# Patient Record
Sex: FEMALE | Race: WHITE | NOT HISPANIC OR LATINO | Employment: OTHER | URBAN - METROPOLITAN AREA
[De-identification: names, ages, dates, MRNs, and addresses within clinical notes are randomized per-mention and may not be internally consistent; named-entity substitution may affect disease eponyms.]

---

## 2023-02-21 PROBLEM — E07.9 THYROID DISEASE: Status: ACTIVE | Noted: 2023-02-21

## 2023-02-21 PROBLEM — E23.0 HYPOPITUITARISM (MULTI): Status: ACTIVE | Noted: 2023-02-21

## 2023-02-21 PROBLEM — E23.0: Status: ACTIVE | Noted: 2023-02-21

## 2023-02-21 PROBLEM — E03.9 HYPOTHYROIDISM: Status: ACTIVE | Noted: 2023-02-21

## 2023-02-21 PROBLEM — R73.9 HYPERGLYCEMIA: Status: ACTIVE | Noted: 2023-02-21

## 2023-02-21 PROBLEM — H53.9 VISION CHANGES: Status: ACTIVE | Noted: 2023-02-21

## 2023-02-21 PROBLEM — E03.9 ACQUIRED HYPOTHYROIDISM: Status: ACTIVE | Noted: 2023-02-21

## 2023-02-21 PROBLEM — E55.9 VITAMIN D INSUFFICIENCY: Status: ACTIVE | Noted: 2023-02-21

## 2023-02-21 PROBLEM — R80.9 PROTEINURIA: Status: ACTIVE | Noted: 2023-02-21

## 2023-02-21 PROBLEM — E87.6 HYPOKALEMIA: Status: ACTIVE | Noted: 2023-02-21

## 2023-02-21 PROBLEM — K52.9 GASTROENTERITIS: Status: ACTIVE | Noted: 2023-02-21

## 2023-02-21 PROBLEM — H91.90 HEARING IMPAIRMENT: Status: ACTIVE | Noted: 2023-02-21

## 2023-02-21 PROBLEM — I10 BENIGN ESSENTIAL HYPERTENSION: Status: ACTIVE | Noted: 2023-02-21

## 2023-02-21 PROBLEM — M81.0 OSTEOPOROSIS: Status: ACTIVE | Noted: 2023-02-21

## 2023-02-21 RX ORDER — HYDROCORTISONE 5 MG/1
TABLET ORAL
COMMUNITY
Start: 2021-03-31 | End: 2023-03-09 | Stop reason: SDUPTHER

## 2023-02-21 RX ORDER — POTASSIUM CHLORIDE 750 MG/1
TABLET, FILM COATED, EXTENDED RELEASE ORAL
COMMUNITY
Start: 2020-08-07 | End: 2023-03-09 | Stop reason: SDUPTHER

## 2023-02-21 RX ORDER — LEVOTHYROXINE SODIUM 50 UG/1
TABLET ORAL
COMMUNITY
Start: 2019-12-23 | End: 2023-07-19 | Stop reason: SDUPTHER

## 2023-02-21 RX ORDER — ALENDRONATE SODIUM 35 MG/1
TABLET ORAL
COMMUNITY
Start: 2021-02-10 | End: 2023-04-25 | Stop reason: SDDI

## 2023-03-09 DIAGNOSIS — E23.0 ACTH DEFICIENCY (MULTI): Primary | ICD-10-CM

## 2023-03-09 DIAGNOSIS — I10 BENIGN ESSENTIAL HYPERTENSION: ICD-10-CM

## 2023-03-09 RX ORDER — LEVOCETIRIZINE DIHYDROCHLORIDE 5 MG/1
1 TABLET, FILM COATED ORAL DAILY
COMMUNITY
Start: 2022-11-30 | End: 2023-04-25 | Stop reason: WASHOUT

## 2023-03-09 RX ORDER — CEFDINIR 300 MG/1
1 CAPSULE ORAL 2 TIMES DAILY
COMMUNITY
Start: 2022-12-14 | End: 2023-04-25 | Stop reason: WASHOUT

## 2023-03-09 RX ORDER — AMLODIPINE BESYLATE 5 MG/1
1 TABLET ORAL DAILY
COMMUNITY
Start: 2022-12-22 | End: 2023-06-19 | Stop reason: SDUPTHER

## 2023-03-09 RX ORDER — AMOXICILLIN 500 MG/1
TABLET, FILM COATED ORAL
COMMUNITY
Start: 2023-01-24 | End: 2023-04-25 | Stop reason: WASHOUT

## 2023-03-09 RX ORDER — BENZONATATE 100 MG/1
1 CAPSULE ORAL
COMMUNITY
Start: 2022-12-14 | End: 2023-04-25 | Stop reason: SDDI

## 2023-03-10 RX ORDER — HYDROCORTISONE 5 MG/1
5 TABLET ORAL EVERY 12 HOURS
Qty: 180 TABLET | Refills: 1 | Status: SHIPPED | OUTPATIENT
Start: 2023-03-10 | End: 2023-10-19 | Stop reason: SDUPTHER

## 2023-03-10 RX ORDER — POTASSIUM CHLORIDE 750 MG/1
10 TABLET, FILM COATED, EXTENDED RELEASE ORAL 2 TIMES DAILY
Qty: 60 TABLET | Refills: 1 | Status: SHIPPED | OUTPATIENT
Start: 2023-03-10 | End: 2023-04-09

## 2023-04-03 ENCOUNTER — TELEPHONE (OUTPATIENT)
Dept: PRIMARY CARE | Facility: CLINIC | Age: 83
End: 2023-04-03
Payer: MEDICARE

## 2023-04-25 ENCOUNTER — PATIENT OUTREACH (OUTPATIENT)
Dept: PRIMARY CARE | Facility: CLINIC | Age: 83
End: 2023-04-25

## 2023-04-25 ENCOUNTER — OFFICE VISIT (OUTPATIENT)
Dept: PRIMARY CARE | Facility: CLINIC | Age: 83
End: 2023-04-25
Payer: MEDICARE

## 2023-04-25 ENCOUNTER — LAB (OUTPATIENT)
Dept: LAB | Facility: LAB | Age: 83
End: 2023-04-25
Payer: MEDICARE

## 2023-04-25 VITALS
HEIGHT: 59 IN | OXYGEN SATURATION: 96 % | BODY MASS INDEX: 24.39 KG/M2 | DIASTOLIC BLOOD PRESSURE: 72 MMHG | SYSTOLIC BLOOD PRESSURE: 131 MMHG | HEART RATE: 53 BPM | WEIGHT: 121 LBS

## 2023-04-25 DIAGNOSIS — Z78.0 ASYMPTOMATIC MENOPAUSAL STATE: ICD-10-CM

## 2023-04-25 DIAGNOSIS — E55.9 VITAMIN D INSUFFICIENCY: ICD-10-CM

## 2023-04-25 DIAGNOSIS — R73.9 HYPERGLYCEMIA: ICD-10-CM

## 2023-04-25 DIAGNOSIS — Z13.6 SCREENING FOR CARDIOVASCULAR CONDITION: ICD-10-CM

## 2023-04-25 DIAGNOSIS — E03.9 HYPOTHYROIDISM, UNSPECIFIED TYPE: ICD-10-CM

## 2023-04-25 DIAGNOSIS — Z12.31 ENCOUNTER FOR SCREENING MAMMOGRAM FOR BREAST CANCER: ICD-10-CM

## 2023-04-25 DIAGNOSIS — I10 BENIGN ESSENTIAL HYPERTENSION: ICD-10-CM

## 2023-04-25 DIAGNOSIS — Z00.00 ROUTINE GENERAL MEDICAL EXAMINATION AT HEALTH CARE FACILITY: Primary | ICD-10-CM

## 2023-04-25 LAB
ANION GAP IN SER/PLAS: 14 MMOL/L (ref 10–20)
APPEARANCE, URINE: NORMAL
BILIRUBIN, URINE: NEGATIVE
BLOOD, URINE: NEGATIVE
CALCIDIOL (25 OH VITAMIN D3) (NG/ML) IN SER/PLAS: 51 NG/ML
CALCIUM (MG/DL) IN SER/PLAS: 9.7 MG/DL (ref 8.6–10.6)
CARBON DIOXIDE, TOTAL (MMOL/L) IN SER/PLAS: 31 MMOL/L (ref 21–32)
CHLORIDE (MMOL/L) IN SER/PLAS: 101 MMOL/L (ref 98–107)
COLOR, URINE: YELLOW
CREATININE (MG/DL) IN SER/PLAS: 1 MG/DL (ref 0.5–1.05)
ESTIMATED AVERAGE GLUCOSE FOR HBA1C: 105 MG/DL
GFR FEMALE: 56 ML/MIN/1.73M2
GLUCOSE (MG/DL) IN SER/PLAS: 80 MG/DL (ref 74–99)
GLUCOSE, URINE: NEGATIVE MG/DL
HEMOGLOBIN A1C/HEMOGLOBIN TOTAL IN BLOOD: 5.3 %
KETONES, URINE: NEGATIVE MG/DL
LEUKOCYTE ESTERASE, URINE: NEGATIVE
NITRITE, URINE: NEGATIVE
PH, URINE: 5 (ref 5–8)
POTASSIUM (MMOL/L) IN SER/PLAS: 4.1 MMOL/L (ref 3.5–5.3)
PROTEIN, URINE: NEGATIVE MG/DL
SODIUM (MMOL/L) IN SER/PLAS: 142 MMOL/L (ref 136–145)
SPECIFIC GRAVITY, URINE: 1.01 (ref 1–1.03)
UREA NITROGEN (MG/DL) IN SER/PLAS: 18 MG/DL (ref 6–23)
UROBILINOGEN, URINE: <2 MG/DL (ref 0–1.9)

## 2023-04-25 PROCEDURE — 93000 ELECTROCARDIOGRAM COMPLETE: CPT | Performed by: INTERNAL MEDICINE

## 2023-04-25 PROCEDURE — 80048 BASIC METABOLIC PNL TOTAL CA: CPT

## 2023-04-25 PROCEDURE — 3078F DIAST BP <80 MM HG: CPT | Performed by: INTERNAL MEDICINE

## 2023-04-25 PROCEDURE — 82306 VITAMIN D 25 HYDROXY: CPT

## 2023-04-25 PROCEDURE — 99214 OFFICE O/P EST MOD 30 MIN: CPT | Performed by: INTERNAL MEDICINE

## 2023-04-25 PROCEDURE — 3075F SYST BP GE 130 - 139MM HG: CPT | Performed by: INTERNAL MEDICINE

## 2023-04-25 PROCEDURE — 1160F RVW MEDS BY RX/DR IN RCRD: CPT | Performed by: INTERNAL MEDICINE

## 2023-04-25 PROCEDURE — G0439 PPPS, SUBSEQ VISIT: HCPCS | Performed by: INTERNAL MEDICINE

## 2023-04-25 PROCEDURE — 36415 COLL VENOUS BLD VENIPUNCTURE: CPT

## 2023-04-25 PROCEDURE — 81003 URINALYSIS AUTO W/O SCOPE: CPT

## 2023-04-25 PROCEDURE — 1159F MED LIST DOCD IN RCRD: CPT | Performed by: INTERNAL MEDICINE

## 2023-04-25 PROCEDURE — 1170F FXNL STATUS ASSESSED: CPT | Performed by: INTERNAL MEDICINE

## 2023-04-25 PROCEDURE — 83036 HEMOGLOBIN GLYCOSYLATED A1C: CPT

## 2023-04-25 RX ORDER — POTASSIUM CHLORIDE 750 MG/1
TABLET, EXTENDED RELEASE ORAL
COMMUNITY
Start: 2023-04-13 | End: 2023-05-18 | Stop reason: SDUPTHER

## 2023-04-25 ASSESSMENT — ACTIVITIES OF DAILY LIVING (ADL)
DRESSING: INDEPENDENT
BATHING: INDEPENDENT
MANAGING_FINANCES: INDEPENDENT
TAKING_MEDICATION: INDEPENDENT
GROCERY_SHOPPING: INDEPENDENT
DOING_HOUSEWORK: INDEPENDENT

## 2023-04-25 ASSESSMENT — PATIENT HEALTH QUESTIONNAIRE - PHQ9
2. FEELING DOWN, DEPRESSED OR HOPELESS: NOT AT ALL
SUM OF ALL RESPONSES TO PHQ9 QUESTIONS 1 AND 2: 0
2. FEELING DOWN, DEPRESSED OR HOPELESS: NOT AT ALL
1. LITTLE INTEREST OR PLEASURE IN DOING THINGS: NOT AT ALL
SUM OF ALL RESPONSES TO PHQ9 QUESTIONS 1 AND 2: 0
1. LITTLE INTEREST OR PLEASURE IN DOING THINGS: NOT AT ALL

## 2023-04-25 ASSESSMENT — LIFESTYLE VARIABLES
AUDIT-C TOTAL SCORE: 2
SKIP TO QUESTIONS 9-10: 0
HOW OFTEN DO YOU HAVE SIX OR MORE DRINKS ON ONE OCCASION: LESS THAN MONTHLY
HOW MANY STANDARD DRINKS CONTAINING ALCOHOL DO YOU HAVE ON A TYPICAL DAY: 1 OR 2
HOW OFTEN DO YOU HAVE A DRINK CONTAINING ALCOHOL: MONTHLY OR LESS

## 2023-04-25 ASSESSMENT — PAIN SCALES - GENERAL: PAINLEVEL: 0-NO PAIN

## 2023-04-25 NOTE — PROGRESS NOTES
"Subjective   Patient ID: Bridgette Moody is a 82 y.o. female who presents for Medicare Annual Wellness Visit Initial.    HPI     Review of Systems    Objective   /72   Pulse 53   Ht 1.499 m (4' 11\")   Wt 54.9 kg (121 lb)   SpO2 96%   BMI 24.44 kg/m²     Physical Exam    Assessment/Plan          "

## 2023-04-25 NOTE — PROGRESS NOTES
"Subjective   Reason for Visit: Bridgette Moody is an 82 y.o. female here for a Medicare Wellness visit.          Reviewed all medications by prescribing practitioner or clinical pharmacist (such as prescriptions, OTCs, herbal therapies and supplements) and documented in the medical record.    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  She is doing well and denies any cough congestion, headache, nausea vomiting and swelling of the legs.  Laboratory studies done on January 12, 2023 revealed potassium of 3.3 GFR of 55 mL/min, hemoglobin of 15.4 hematocrit of 48.1 and other studies were unremarkable.  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  She is also here for Medicare annual wellness exam.  Patient Care Team:  Vimal Mac MD as PCP - General  Vimal Mac MD as PCP - [employer] PCP     Review of Systems   Constitutional: Negative.    HENT: Negative.     Eyes: Negative.    Respiratory: Negative.     Cardiovascular: Negative.    Gastrointestinal: Negative.    Endocrine: Negative.    Genitourinary: Negative.    Musculoskeletal: Negative.    Skin: Negative.    Allergic/Immunologic: Negative.    Neurological: Negative.    Hematological: Negative.    Psychiatric/Behavioral: Negative.         Objective   Vitals:  /72   Pulse 53   Ht 1.499 m (4' 11\")   Wt 54.9 kg (121 lb)   SpO2 96%   BMI 24.44 kg/m²       Physical Exam  Constitutional:       Appearance: Normal appearance. She is normal weight.   HENT:      Right Ear: Tympanic membrane normal.      Left Ear: Tympanic membrane and ear canal " normal.      Nose: Nose normal.   Neck:      Vascular: No carotid bruit.   Cardiovascular:      Rate and Rhythm: Normal rate.   Pulmonary:      Effort: No respiratory distress.      Breath sounds: No stridor. No wheezing.   Abdominal:      Palpations: Abdomen is soft.      Tenderness: There is no guarding or rebound.   Skin:     Coloration: Skin is not jaundiced.   Neurological:      General: No focal deficit present.      Mental Status: She is alert and oriented to person, place, and time.   Psychiatric:         Mood and Affect: Mood normal.         Assessment/Plan   Problem List Items Addressed This Visit    None  Patient will be scheduled for mammogram regarding screening for breast cancer.  She is also advised to obtain routine blood work.  She will continue to follow-up with endocrinology clinic regarding history of hypopituitarism.  She will continue current medications and will return to clinic in 4 months for follow-up visit.

## 2023-04-26 ASSESSMENT — ENCOUNTER SYMPTOMS
CONSTITUTIONAL NEGATIVE: 1
PSYCHIATRIC NEGATIVE: 1
HEMATOLOGIC/LYMPHATIC NEGATIVE: 1
CARDIOVASCULAR NEGATIVE: 1
NEUROLOGICAL NEGATIVE: 1
EYES NEGATIVE: 1
MUSCULOSKELETAL NEGATIVE: 1
ALLERGIC/IMMUNOLOGIC NEGATIVE: 1
ENDOCRINE NEGATIVE: 1
RESPIRATORY NEGATIVE: 1
GASTROINTESTINAL NEGATIVE: 1

## 2023-05-06 ENCOUNTER — TELEPHONE (OUTPATIENT)
Dept: PRIMARY CARE | Facility: CLINIC | Age: 83
End: 2023-05-06
Payer: MEDICARE

## 2023-05-06 NOTE — TELEPHONE ENCOUNTER
Left voice for patient to schedule a follow up either in office or phone visit to go over test results

## 2023-05-06 NOTE — TELEPHONE ENCOUNTER
----- Message from Vimal Mac MD sent at 5/5/2023 10:44 AM EDT -----  Okay to schedule phone visit to discuss recent bone density and mammogram results next week

## 2023-05-18 DIAGNOSIS — I15.9 SECONDARY HYPERTENSION: Primary | ICD-10-CM

## 2023-05-18 RX ORDER — POTASSIUM CHLORIDE 750 MG/1
10 TABLET, EXTENDED RELEASE ORAL DAILY
Qty: 90 TABLET | Refills: 1 | Status: SHIPPED | OUTPATIENT
Start: 2023-05-18 | End: 2023-05-22 | Stop reason: SDUPTHER

## 2023-05-22 DIAGNOSIS — I15.9 SECONDARY HYPERTENSION: ICD-10-CM

## 2023-05-22 RX ORDER — POTASSIUM CHLORIDE 750 MG/1
10 TABLET, EXTENDED RELEASE ORAL DAILY
Qty: 90 TABLET | Refills: 1 | Status: SHIPPED | OUTPATIENT
Start: 2023-05-22 | End: 2023-08-20

## 2023-05-23 ENCOUNTER — TELEPHONE (OUTPATIENT)
Dept: PRIMARY CARE | Facility: CLINIC | Age: 83
End: 2023-05-23
Payer: MEDICARE

## 2023-06-19 DIAGNOSIS — I10 BENIGN ESSENTIAL HYPERTENSION: Primary | ICD-10-CM

## 2023-06-19 RX ORDER — AMLODIPINE BESYLATE 5 MG/1
5 TABLET ORAL DAILY
Qty: 90 TABLET | Refills: 0 | Status: SHIPPED | OUTPATIENT
Start: 2023-06-19 | End: 2023-06-21 | Stop reason: SDUPTHER

## 2023-06-21 DIAGNOSIS — I10 BENIGN ESSENTIAL HYPERTENSION: ICD-10-CM

## 2023-06-21 RX ORDER — AMLODIPINE BESYLATE 5 MG/1
5 TABLET ORAL DAILY
Qty: 90 TABLET | Refills: 0 | Status: SHIPPED | OUTPATIENT
Start: 2023-06-21 | End: 2023-06-22 | Stop reason: SDUPTHER

## 2023-06-22 DIAGNOSIS — I10 BENIGN ESSENTIAL HYPERTENSION: ICD-10-CM

## 2023-06-22 RX ORDER — AMLODIPINE BESYLATE 5 MG/1
5 TABLET ORAL DAILY
Qty: 90 TABLET | Refills: 3 | Status: SHIPPED | OUTPATIENT
Start: 2023-06-22 | End: 2023-10-02 | Stop reason: SDUPTHER

## 2023-07-19 ENCOUNTER — TELEPHONE (OUTPATIENT)
Dept: PRIMARY CARE | Facility: CLINIC | Age: 83
End: 2023-07-19
Payer: MEDICARE

## 2023-07-19 DIAGNOSIS — E03.9 HYPOTHYROIDISM, UNSPECIFIED TYPE: Primary | ICD-10-CM

## 2023-07-19 RX ORDER — LEVOTHYROXINE SODIUM 50 UG/1
50 TABLET ORAL DAILY
Qty: 90 TABLET | Refills: 1 | Status: SHIPPED | OUTPATIENT
Start: 2023-07-19 | End: 2023-08-29 | Stop reason: SDUPTHER

## 2023-08-25 ENCOUNTER — OFFICE VISIT (OUTPATIENT)
Dept: PRIMARY CARE | Facility: CLINIC | Age: 83
End: 2023-08-25
Payer: MEDICARE

## 2023-08-25 ENCOUNTER — LAB (OUTPATIENT)
Dept: LAB | Facility: LAB | Age: 83
End: 2023-08-25
Payer: MEDICARE

## 2023-08-25 VITALS
TEMPERATURE: 98.6 F | BODY MASS INDEX: 24.6 KG/M2 | DIASTOLIC BLOOD PRESSURE: 71 MMHG | HEIGHT: 59 IN | HEART RATE: 58 BPM | OXYGEN SATURATION: 98 % | WEIGHT: 122 LBS | SYSTOLIC BLOOD PRESSURE: 155 MMHG

## 2023-08-25 DIAGNOSIS — E78.5 DYSLIPIDEMIA: ICD-10-CM

## 2023-08-25 DIAGNOSIS — E03.9 HYPOTHYROIDISM, UNSPECIFIED TYPE: ICD-10-CM

## 2023-08-25 DIAGNOSIS — I10 BENIGN ESSENTIAL HYPERTENSION: ICD-10-CM

## 2023-08-25 DIAGNOSIS — E23.0 ACTH DEFICIENCY (MULTI): ICD-10-CM

## 2023-08-25 DIAGNOSIS — M81.0 AGE-RELATED OSTEOPOROSIS WITHOUT CURRENT PATHOLOGICAL FRACTURE: ICD-10-CM

## 2023-08-25 DIAGNOSIS — R73.9 HYPERGLYCEMIA: ICD-10-CM

## 2023-08-25 DIAGNOSIS — E23.0 HYPOPITUITARISM (MULTI): Primary | ICD-10-CM

## 2023-08-25 DIAGNOSIS — E23.0 HYPOPITUITARISM (MULTI): ICD-10-CM

## 2023-08-25 DIAGNOSIS — E03.9 ACQUIRED HYPOTHYROIDISM: ICD-10-CM

## 2023-08-25 LAB
ALANINE AMINOTRANSFERASE (SGPT) (U/L) IN SER/PLAS: 12 U/L (ref 7–45)
ALBUMIN (G/DL) IN SER/PLAS: 4.6 G/DL (ref 3.4–5)
ALKALINE PHOSPHATASE (U/L) IN SER/PLAS: 63 U/L (ref 33–136)
ANION GAP IN SER/PLAS: 13 MMOL/L (ref 10–20)
ASPARTATE AMINOTRANSFERASE (SGOT) (U/L) IN SER/PLAS: 32 U/L (ref 9–39)
BASOPHILS (10*3/UL) IN BLOOD BY AUTOMATED COUNT: 0.05 X10E9/L (ref 0–0.1)
BASOPHILS/100 LEUKOCYTES IN BLOOD BY AUTOMATED COUNT: 0.8 % (ref 0–2)
BILIRUBIN TOTAL (MG/DL) IN SER/PLAS: 1.1 MG/DL (ref 0–1.2)
CALCIDIOL (25 OH VITAMIN D3) (NG/ML) IN SER/PLAS: 49 NG/ML
CALCIUM (MG/DL) IN SER/PLAS: 10.5 MG/DL (ref 8.6–10.6)
CARBON DIOXIDE, TOTAL (MMOL/L) IN SER/PLAS: 30 MMOL/L (ref 21–32)
CHLORIDE (MMOL/L) IN SER/PLAS: 100 MMOL/L (ref 98–107)
CHOLESTEROL (MG/DL) IN SER/PLAS: 206 MG/DL (ref 0–199)
CHOLESTEROL IN HDL (MG/DL) IN SER/PLAS: 68.4 MG/DL
CHOLESTEROL/HDL RATIO: 3
CREATININE (MG/DL) IN SER/PLAS: 1.12 MG/DL (ref 0.5–1.05)
EOSINOPHILS (10*3/UL) IN BLOOD BY AUTOMATED COUNT: 0.15 X10E9/L (ref 0–0.4)
EOSINOPHILS/100 LEUKOCYTES IN BLOOD BY AUTOMATED COUNT: 2.4 % (ref 0–6)
ERYTHROCYTE DISTRIBUTION WIDTH (RATIO) BY AUTOMATED COUNT: 13.6 % (ref 11.5–14.5)
ERYTHROCYTE MEAN CORPUSCULAR HEMOGLOBIN CONCENTRATION (G/DL) BY AUTOMATED: 33.6 G/DL (ref 32–36)
ERYTHROCYTE MEAN CORPUSCULAR VOLUME (FL) BY AUTOMATED COUNT: 88 FL (ref 80–100)
ERYTHROCYTES (10*6/UL) IN BLOOD BY AUTOMATED COUNT: 5.1 X10E12/L (ref 4–5.2)
ESTIMATED AVERAGE GLUCOSE FOR HBA1C: 97 MG/DL
GFR FEMALE: 49 ML/MIN/1.73M2
GLUCOSE (MG/DL) IN SER/PLAS: 78 MG/DL (ref 74–99)
HEMATOCRIT (%) IN BLOOD BY AUTOMATED COUNT: 44.7 % (ref 36–46)
HEMOGLOBIN (G/DL) IN BLOOD: 15 G/DL (ref 12–16)
HEMOGLOBIN A1C/HEMOGLOBIN TOTAL IN BLOOD: 5 %
IMMATURE GRANULOCYTES/100 LEUKOCYTES IN BLOOD BY AUTOMATED COUNT: 0.5 % (ref 0–0.9)
LDL: 116 MG/DL (ref 0–99)
LEUKOCYTES (10*3/UL) IN BLOOD BY AUTOMATED COUNT: 6.2 X10E9/L (ref 4.4–11.3)
LYMPHOCYTES (10*3/UL) IN BLOOD BY AUTOMATED COUNT: 2.65 X10E9/L (ref 0.8–3)
LYMPHOCYTES/100 LEUKOCYTES IN BLOOD BY AUTOMATED COUNT: 42.5 % (ref 13–44)
MONOCYTES (10*3/UL) IN BLOOD BY AUTOMATED COUNT: 0.51 X10E9/L (ref 0.05–0.8)
MONOCYTES/100 LEUKOCYTES IN BLOOD BY AUTOMATED COUNT: 8.2 % (ref 2–10)
NEUTROPHILS (10*3/UL) IN BLOOD BY AUTOMATED COUNT: 2.85 X10E9/L (ref 1.6–5.5)
NEUTROPHILS/100 LEUKOCYTES IN BLOOD BY AUTOMATED COUNT: 45.6 % (ref 40–80)
NRBC (PER 100 WBCS) BY AUTOMATED COUNT: 0 /100 WBC (ref 0–0)
PLATELETS (10*3/UL) IN BLOOD AUTOMATED COUNT: 344 X10E9/L (ref 150–450)
POTASSIUM (MMOL/L) IN SER/PLAS: 4 MMOL/L (ref 3.5–5.3)
PROTEIN TOTAL: 7.4 G/DL (ref 6.4–8.2)
SODIUM (MMOL/L) IN SER/PLAS: 139 MMOL/L (ref 136–145)
THYROTROPIN (MIU/L) IN SER/PLAS BY DETECTION LIMIT <= 0.05 MIU/L: 0.12 MIU/L (ref 0.44–3.98)
TRIGLYCERIDE (MG/DL) IN SER/PLAS: 110 MG/DL (ref 0–149)
UREA NITROGEN (MG/DL) IN SER/PLAS: 14 MG/DL (ref 6–23)
VLDL: 22 MG/DL (ref 0–40)

## 2023-08-25 PROCEDURE — 36415 COLL VENOUS BLD VENIPUNCTURE: CPT

## 2023-08-25 PROCEDURE — 80053 COMPREHEN METABOLIC PANEL: CPT

## 2023-08-25 PROCEDURE — 3077F SYST BP >= 140 MM HG: CPT | Performed by: INTERNAL MEDICINE

## 2023-08-25 PROCEDURE — 81001 URINALYSIS AUTO W/SCOPE: CPT

## 2023-08-25 PROCEDURE — 85025 COMPLETE CBC W/AUTO DIFF WBC: CPT

## 2023-08-25 PROCEDURE — 82306 VITAMIN D 25 HYDROXY: CPT

## 2023-08-25 PROCEDURE — 99214 OFFICE O/P EST MOD 30 MIN: CPT | Performed by: INTERNAL MEDICINE

## 2023-08-25 PROCEDURE — 1160F RVW MEDS BY RX/DR IN RCRD: CPT | Performed by: INTERNAL MEDICINE

## 2023-08-25 PROCEDURE — 84443 ASSAY THYROID STIM HORMONE: CPT

## 2023-08-25 PROCEDURE — 83036 HEMOGLOBIN GLYCOSYLATED A1C: CPT

## 2023-08-25 PROCEDURE — 1126F AMNT PAIN NOTED NONE PRSNT: CPT | Performed by: INTERNAL MEDICINE

## 2023-08-25 PROCEDURE — 3078F DIAST BP <80 MM HG: CPT | Performed by: INTERNAL MEDICINE

## 2023-08-25 PROCEDURE — 1159F MED LIST DOCD IN RCRD: CPT | Performed by: INTERNAL MEDICINE

## 2023-08-25 PROCEDURE — 1036F TOBACCO NON-USER: CPT | Performed by: INTERNAL MEDICINE

## 2023-08-25 PROCEDURE — 80061 LIPID PANEL: CPT

## 2023-08-25 RX ORDER — RISEDRONATE SODIUM 35 MG/1
35 TABLET, FILM COATED ORAL
Qty: 12 TABLET | Refills: 1 | Status: SHIPPED | OUTPATIENT
Start: 2023-08-25 | End: 2023-11-03 | Stop reason: HOSPADM

## 2023-08-25 ASSESSMENT — PATIENT HEALTH QUESTIONNAIRE - PHQ9
1. LITTLE INTEREST OR PLEASURE IN DOING THINGS: NOT AT ALL
SUM OF ALL RESPONSES TO PHQ9 QUESTIONS 1 AND 2: 0
2. FEELING DOWN, DEPRESSED OR HOPELESS: NOT AT ALL

## 2023-08-25 ASSESSMENT — COLUMBIA-SUICIDE SEVERITY RATING SCALE - C-SSRS: 1. IN THE PAST MONTH, HAVE YOU WISHED YOU WERE DEAD OR WISHED YOU COULD GO TO SLEEP AND NOT WAKE UP?: NO

## 2023-08-25 NOTE — PROGRESS NOTES
"Subjective   Patient ID: Bridgette Moody is a 82 y.o. female who presents for Follow-up.    HPI     Review of Systems    Objective   /71   Pulse 58   Temp 37 °C (98.6 °F)   Ht 1.499 m (4' 11\")   Wt 55.3 kg (122 lb)   SpO2 98%   BMI 24.64 kg/m²     Physical Exam    Assessment/Plan          "

## 2023-08-25 NOTE — PROGRESS NOTES
"Subjective   Patient ID: Bridgette Moody is a 82 y.o. female who presents for Follow-up.    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  She is doing well and is requesting medication for osteoporosis.  She denies any chest pain, cough congestion abdominal pain nausea vomiting and swelling of the legs.    Review of Systems   Constitutional: Negative.    HENT: Negative.     Eyes: Negative.    Respiratory: Negative.     Cardiovascular: Negative.    Gastrointestinal: Negative.    Endocrine: Negative.    Genitourinary: Negative.    Musculoskeletal: Negative.    Skin: Negative.    Allergic/Immunologic: Negative.    Neurological: Negative.    Hematological: Negative.    Psychiatric/Behavioral: Negative.         Objective   /71   Pulse 58   Temp 37 °C (98.6 °F)   Ht 1.499 m (4' 11\")   Wt 55.3 kg (122 lb)   SpO2 98%   BMI 24.64 kg/m²     Physical Exam  Constitutional:       Appearance: Normal appearance. She is normal weight.   HENT:      Right Ear: Tympanic membrane normal.      Left Ear: Tympanic membrane and ear canal normal.      Nose: Nose normal.   Neck:      Vascular: No carotid bruit.   Cardiovascular:      Rate and Rhythm: Normal rate.   Pulmonary:      Effort: No respiratory distress.      Breath sounds: No stridor. No wheezing.   Abdominal:      Palpations: Abdomen is soft.      Tenderness: There is no abdominal tenderness. There is no guarding or rebound.   Skin:     Coloration: Skin is not jaundiced.      Findings: No bruising.   Neurological:      General: No focal deficit present.      Mental Status: She is alert and oriented to person, place, and time.   Psychiatric:         Mood and Affect: Mood normal.         Assessment/Plan    patient will be started on alendronate 35 mg weekly for " osteoporosis and steroid dependence.  She will be scheduled for routine blood work.  She will be notified about her test results.  She will continue to follow-up with endocrinology clinic regarding hypopituitarism.  She will return to clinic in 3 months for follow-up visit..

## 2023-08-26 LAB
APPEARANCE, URINE: ABNORMAL
BILIRUBIN, URINE: NEGATIVE
BLOOD, URINE: NEGATIVE
CALCIUM OXALATE CRYSTALS, URINE: ABNORMAL /HPF
COLOR, URINE: YELLOW
GLUCOSE, URINE: NEGATIVE MG/DL
HYALINE CASTS, URINE: ABNORMAL /LPF
KETONES, URINE: NEGATIVE MG/DL
LEUKOCYTE ESTERASE, URINE: NEGATIVE
MUCUS, URINE: ABNORMAL /LPF
NITRITE, URINE: NEGATIVE
PH, URINE: 5 (ref 5–8)
PROTEIN, URINE: ABNORMAL MG/DL
RBC, URINE: 1 /HPF (ref 0–5)
SPECIFIC GRAVITY, URINE: 1.01 (ref 1–1.03)
SQUAMOUS EPITHELIAL CELLS, URINE: <1 /HPF
UROBILINOGEN, URINE: <2 MG/DL (ref 0–1.9)
WBC, URINE: 5 /HPF (ref 0–5)

## 2023-08-27 ASSESSMENT — ENCOUNTER SYMPTOMS
ALLERGIC/IMMUNOLOGIC NEGATIVE: 1
MUSCULOSKELETAL NEGATIVE: 1
NEUROLOGICAL NEGATIVE: 1
CONSTITUTIONAL NEGATIVE: 1
CARDIOVASCULAR NEGATIVE: 1
EYES NEGATIVE: 1
GASTROINTESTINAL NEGATIVE: 1
HEMATOLOGIC/LYMPHATIC NEGATIVE: 1
PSYCHIATRIC NEGATIVE: 1
ENDOCRINE NEGATIVE: 1
RESPIRATORY NEGATIVE: 1

## 2023-08-29 DIAGNOSIS — E03.9 HYPOTHYROIDISM, UNSPECIFIED TYPE: ICD-10-CM

## 2023-08-29 RX ORDER — LEVOTHYROXINE SODIUM 25 UG/1
25 TABLET ORAL DAILY
Qty: 90 TABLET | Refills: 1 | Status: SHIPPED | OUTPATIENT
Start: 2023-08-29 | End: 2023-12-23

## 2023-09-25 ENCOUNTER — PATIENT OUTREACH (OUTPATIENT)
Dept: PRIMARY CARE | Facility: CLINIC | Age: 83
End: 2023-09-25
Payer: MEDICARE

## 2023-09-25 ENCOUNTER — DOCUMENTATION (OUTPATIENT)
Dept: PRIMARY CARE | Facility: CLINIC | Age: 83
End: 2023-09-25
Payer: MEDICARE

## 2023-09-25 DIAGNOSIS — M62.82 NON-TRAUMATIC RHABDOMYOLYSIS: ICD-10-CM

## 2023-09-25 RX ORDER — POTASSIUM CHLORIDE 750 MG/1
10 TABLET, FILM COATED, EXTENDED RELEASE ORAL 2 TIMES DAILY
COMMUNITY
End: 2023-10-10 | Stop reason: SDUPTHER

## 2023-09-25 RX ORDER — DICYCLOMINE HYDROCHLORIDE 20 MG/1
20 TABLET ORAL 4 TIMES DAILY PRN
COMMUNITY
End: 2023-11-02 | Stop reason: ENTERED-IN-ERROR

## 2023-09-25 NOTE — PROGRESS NOTES
Discharge Facility: [facility]  Discharge Diagnosis: Rhabdomyolysis  Admission Date: 9/17/23  Discharge Date: 9/23/23    PCP Appointment Date: 9/26/23  Specialist Appointment Date: n/a  Hospital Encounter and Summary: Linked   See discharge assessment below for further details   Engagement  Call Start Time: 1450 (9/25/2023  6:20 PM)    Medications  Medications reviewed with patient/caregiver?: Yes (9/25/2023  6:20 PM)  Is the patient having any side effects they believe may be caused by any medication additions or changes?: No (9/25/2023  6:20 PM)  Does the patient have all medications ordered at discharge?: Yes (9/25/2023  6:20 PM)  Care Management Interventions: No intervention needed (9/25/2023  6:20 PM)  Prescription Comments: No medication changes (9/25/2023  6:20 PM)  Is the patient taking all medications as directed (includes completed medication regime)?: Yes (9/25/2023  6:20 PM)  Care Management Interventions: Provided patient education (9/25/2023  6:20 PM)  Medication Comments: See medication list (9/25/2023  6:20 PM)    Appointments  Care Management Interventions: Verified appointment date/time/provider (9/25/2023  6:20 PM)  Has the patient kept scheduled appointments due by today?: Not applicable (9/25/2023  6:20 PM)  Care Management Interventions: Advised patient to keep appointment (9/25/2023  6:20 PM)    Self Management  What is the home health agency?: [facility] (9/25/2023  6:20 PM)  Has [facility] visited the patient within 72 hours of discharge?: Call prior to 72 hours (9/25/2023  6:20 PM)  What Durable Medical Equipment (DME) was ordered?: n/a (9/25/2023  6:20 PM)    Patient Teaching  Does the patient have access to their discharge instructions?: Yes (9/25/2023  6:20 PM)  Care Management Interventions: Reviewed instructions with patient (9/25/2023  6:20 PM)  What is the patient's perception of their health status since discharge?: Same (9/25/2023  6:20 PM)  Is the patient/caregiver able to teach  back the hierarchy of who to call/visit for symptoms/problems? PCP, Specialist, Home Health nurse, Urgent Care, ED, 911: Yes (9/25/2023  6:20 PM)    Wrap Up  Wrap Up Additional Comments: Called the patient for initial outreach post discharge from the hospital. Patient states she is home and recovering fairly. Patient denied any new or worsening symptoms at this time. Patient denied the need for DME or assistance obtaining transportation. Patient states she is unsure if [facility] has called yet as her  usually answers the phone and may have spoken to them. I requested she call back if they have not reached out within 72 hours of discharge. Patient confirmed they had their discharge summary and all medications needed in the home. Patient denied any questions or concerns regarding their hospitalization. TCM phone number provided with the patient encouraged to call with any needs or questions that may arise to help prevent another hospitalization. Patient verbalized her understanding and stated she had no further questions or concerns at this time, but would call back if needed. PCP follow up already scheduled for 9/26/23. (9/25/2023  6:20 PM)  Call End Time: 1500 (9/25/2023  6:20 PM)

## 2023-09-26 ENCOUNTER — OFFICE VISIT (OUTPATIENT)
Dept: PRIMARY CARE | Facility: CLINIC | Age: 83
End: 2023-09-26
Payer: MEDICARE

## 2023-09-26 ENCOUNTER — LAB (OUTPATIENT)
Dept: LAB | Facility: LAB | Age: 83
End: 2023-09-26
Payer: MEDICARE

## 2023-09-26 VITALS
WEIGHT: 121 LBS | OXYGEN SATURATION: 95 % | BODY MASS INDEX: 24.39 KG/M2 | DIASTOLIC BLOOD PRESSURE: 68 MMHG | SYSTOLIC BLOOD PRESSURE: 123 MMHG | HEIGHT: 59 IN | HEART RATE: 50 BPM

## 2023-09-26 DIAGNOSIS — M62.82 NON-TRAUMATIC RHABDOMYOLYSIS: ICD-10-CM

## 2023-09-26 DIAGNOSIS — R19.7 DIARRHEA, UNSPECIFIED TYPE: ICD-10-CM

## 2023-09-26 DIAGNOSIS — N17.0 ACUTE KIDNEY INJURY (AKI) WITH ACUTE TUBULAR NECROSIS (ATN) (CMS-HCC): Primary | ICD-10-CM

## 2023-09-26 PROCEDURE — 3078F DIAST BP <80 MM HG: CPT | Performed by: INTERNAL MEDICINE

## 2023-09-26 PROCEDURE — 36415 COLL VENOUS BLD VENIPUNCTURE: CPT

## 2023-09-26 PROCEDURE — 3074F SYST BP LT 130 MM HG: CPT | Performed by: INTERNAL MEDICINE

## 2023-09-26 PROCEDURE — 1126F AMNT PAIN NOTED NONE PRSNT: CPT | Performed by: INTERNAL MEDICINE

## 2023-09-26 PROCEDURE — 82550 ASSAY OF CK (CPK): CPT

## 2023-09-26 PROCEDURE — 83735 ASSAY OF MAGNESIUM: CPT

## 2023-09-26 PROCEDURE — 1159F MED LIST DOCD IN RCRD: CPT | Performed by: INTERNAL MEDICINE

## 2023-09-26 PROCEDURE — 99496 TRANSJ CARE MGMT HIGH F2F 7D: CPT | Performed by: INTERNAL MEDICINE

## 2023-09-26 PROCEDURE — 80048 BASIC METABOLIC PNL TOTAL CA: CPT

## 2023-09-26 PROCEDURE — 1036F TOBACCO NON-USER: CPT | Performed by: INTERNAL MEDICINE

## 2023-09-26 PROCEDURE — 1160F RVW MEDS BY RX/DR IN RCRD: CPT | Performed by: INTERNAL MEDICINE

## 2023-09-26 RX ORDER — CHOLESTYRAMINE 4 G/9G
1 POWDER, FOR SUSPENSION ORAL 2 TIMES DAILY
Qty: 20 PACKET | Refills: 0 | Status: SHIPPED | OUTPATIENT
Start: 2023-09-26 | End: 2023-11-03 | Stop reason: HOSPADM

## 2023-09-26 ASSESSMENT — PATIENT HEALTH QUESTIONNAIRE - PHQ9
2. FEELING DOWN, DEPRESSED OR HOPELESS: NOT AT ALL
1. LITTLE INTEREST OR PLEASURE IN DOING THINGS: NOT AT ALL
SUM OF ALL RESPONSES TO PHQ9 QUESTIONS 1 AND 2: 0

## 2023-09-26 ASSESSMENT — COLUMBIA-SUICIDE SEVERITY RATING SCALE - C-SSRS: 1. IN THE PAST MONTH, HAVE YOU WISHED YOU WERE DEAD OR WISHED YOU COULD GO TO SLEEP AND NOT WAKE UP?: NO

## 2023-09-26 NOTE — PROGRESS NOTES
"Subjective   Patient ID: Bridgette Moody is a 82 y.o. female who presents for Follow-up.    HPI     Review of Systems    Objective   /68   Pulse 50   Ht 1.499 m (4' 11\")   Wt 54.9 kg (121 lb)   SpO2 95%   BMI 24.44 kg/m²     Physical Exam    Assessment/Plan          "

## 2023-09-26 NOTE — PROGRESS NOTES
"Subjective   Patient ID: Bridgette Moody is a 82 y.o. female who presents for Follow-up.    HPI patient presents to clinic after her discharge from [facility] on 9/23/23 .  She was admitted there for acute kidney injury, rhabdomyolysis, elevated troponin secondary to demand ischemia, dehydration, diarrheal illness with hypokalemia and fall at home.  During hospitalization , she had CT of the head which came back negative for any intracranial pathology and had CT of the chest abdomen pelvis and back negative for any traumatic injury, fluid collection or any mass lesion.  Please see discharge summary for detailed information.  She seems to be doing fairly well and is accompanied by her son.  She is still having generalized weakness, decreased appetite and diarrhea up to 4-5 times on a daily basis.  Her  son is concerned about safety of his parents since  he lives approximately an hour away from them .  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     Review of Systems   Constitutional: Negative.    HENT: Negative.     Eyes: Negative.    Respiratory: Negative.     Cardiovascular: Negative.    Gastrointestinal: Negative.    Endocrine: Negative.    Genitourinary: Negative.    Musculoskeletal: Negative.    Skin: Negative.    Allergic/Immunologic: Negative.    Neurological: Negative.    Hematological: Negative.    Psychiatric/Behavioral: Negative.         Objective   /68   Pulse 50   Ht 1.499 m (4' 11\")   Wt 54.9 kg (121 lb)   SpO2 95%   BMI 24.44 kg/m²     Physical Exam  Constitutional:       Appearance: Normal appearance. She is normal weight.   HENT:      Right Ear: Tympanic membrane normal.      Left Ear: Tympanic membrane and ear canal normal.      Nose: Nose normal.   Neck:      Vascular: No carotid bruit.   Cardiovascular:      Rate and Rhythm: " Normal rate.   Pulmonary:      Effort: No respiratory distress.      Breath sounds: No stridor. No wheezing.   Abdominal:      Palpations: Abdomen is soft.      Tenderness: There is no guarding or rebound.   Skin:     Coloration: Skin is not jaundiced.   Neurological:      General: No focal deficit present.      Mental Status: She is alert and oriented to person, place, and time.   Psychiatric:         Mood and Affect: Mood normal.         Assessment/Plan    patient is encouraged to consider moving near her son's house due to safety reason.  She is advised to drink fluids and avoid sugary and sweetened drinks.  She is given trial with Questran for diarrhea and will retest her stool studies.  He is to discontinue alendronate because of upset stomach and persistent diarrhea.  She will continue other medications and will return to clinic in 2 weeks for follow-up visit.

## 2023-09-27 LAB
ANION GAP IN SER/PLAS: 16 MMOL/L (ref 10–20)
CALCIUM (MG/DL) IN SER/PLAS: 10.2 MG/DL (ref 8.6–10.6)
CARBON DIOXIDE, TOTAL (MMOL/L) IN SER/PLAS: 28 MMOL/L (ref 21–32)
CHLORIDE (MMOL/L) IN SER/PLAS: 98 MMOL/L (ref 98–107)
CREATINE KINASE (U/L) IN SER/PLAS: 48 U/L (ref 0–215)
CREATININE (MG/DL) IN SER/PLAS: 1.18 MG/DL (ref 0.5–1.05)
GFR FEMALE: 46 ML/MIN/1.73M2
GLUCOSE (MG/DL) IN SER/PLAS: 95 MG/DL (ref 74–99)
MAGNESIUM (MG/DL) IN SER/PLAS: 2.03 MG/DL (ref 1.6–2.4)
POTASSIUM (MMOL/L) IN SER/PLAS: 4.8 MMOL/L (ref 3.5–5.3)
SODIUM (MMOL/L) IN SER/PLAS: 137 MMOL/L (ref 136–145)
UREA NITROGEN (MG/DL) IN SER/PLAS: 17 MG/DL (ref 6–23)

## 2023-09-29 ASSESSMENT — ENCOUNTER SYMPTOMS
CONSTITUTIONAL NEGATIVE: 1
ENDOCRINE NEGATIVE: 1
EYES NEGATIVE: 1
RESPIRATORY NEGATIVE: 1
MUSCULOSKELETAL NEGATIVE: 1
PSYCHIATRIC NEGATIVE: 1
GASTROINTESTINAL NEGATIVE: 1
HEMATOLOGIC/LYMPHATIC NEGATIVE: 1
ALLERGIC/IMMUNOLOGIC NEGATIVE: 1
NEUROLOGICAL NEGATIVE: 1
CARDIOVASCULAR NEGATIVE: 1

## 2023-10-02 DIAGNOSIS — I10 BENIGN ESSENTIAL HYPERTENSION: ICD-10-CM

## 2023-10-02 RX ORDER — AMLODIPINE BESYLATE 5 MG/1
5 TABLET ORAL DAILY
Qty: 90 TABLET | Refills: 3 | Status: SHIPPED | OUTPATIENT
Start: 2023-10-02 | End: 2024-10-01

## 2023-10-04 ENCOUNTER — PATIENT OUTREACH (OUTPATIENT)
Dept: PRIMARY CARE | Facility: CLINIC | Age: 83
End: 2023-10-04
Payer: MEDICARE

## 2023-10-04 DIAGNOSIS — N17.0 ACUTE KIDNEY INJURY (AKI) WITH ACUTE TUBULAR NECROSIS (ATN) (CMS-HCC): ICD-10-CM

## 2023-10-04 NOTE — PROGRESS NOTES
Unable to reach patient for call back after patient's follow up appointment with PCP.   DIYAM with call back number for patient to call if needed   If no voicemail available call attempts x 2 were made to contact the patient to assist with any questions or concerns patient may have.

## 2023-10-10 ENCOUNTER — OFFICE VISIT (OUTPATIENT)
Dept: PRIMARY CARE | Facility: CLINIC | Age: 83
End: 2023-10-10
Payer: MEDICARE

## 2023-10-10 VITALS
HEIGHT: 59 IN | DIASTOLIC BLOOD PRESSURE: 74 MMHG | BODY MASS INDEX: 24.39 KG/M2 | OXYGEN SATURATION: 98 % | WEIGHT: 121 LBS | SYSTOLIC BLOOD PRESSURE: 127 MMHG | TEMPERATURE: 98.2 F | HEART RATE: 79 BPM

## 2023-10-10 DIAGNOSIS — R09.82 POST-NASAL DRIP: ICD-10-CM

## 2023-10-10 DIAGNOSIS — E23.0 ACTH DEFICIENCY (MULTI): Primary | ICD-10-CM

## 2023-10-10 PROCEDURE — 1036F TOBACCO NON-USER: CPT | Performed by: INTERNAL MEDICINE

## 2023-10-10 PROCEDURE — 99213 OFFICE O/P EST LOW 20 MIN: CPT | Performed by: INTERNAL MEDICINE

## 2023-10-10 PROCEDURE — 1160F RVW MEDS BY RX/DR IN RCRD: CPT | Performed by: INTERNAL MEDICINE

## 2023-10-10 PROCEDURE — 3078F DIAST BP <80 MM HG: CPT | Performed by: INTERNAL MEDICINE

## 2023-10-10 PROCEDURE — 1159F MED LIST DOCD IN RCRD: CPT | Performed by: INTERNAL MEDICINE

## 2023-10-10 PROCEDURE — 1126F AMNT PAIN NOTED NONE PRSNT: CPT | Performed by: INTERNAL MEDICINE

## 2023-10-10 PROCEDURE — 3074F SYST BP LT 130 MM HG: CPT | Performed by: INTERNAL MEDICINE

## 2023-10-10 RX ORDER — POTASSIUM CHLORIDE 750 MG/1
10 TABLET, FILM COATED, EXTENDED RELEASE ORAL 2 TIMES DAILY
Qty: 180 TABLET | Refills: 3 | Status: SHIPPED | OUTPATIENT
Start: 2023-10-10 | End: 2024-10-09

## 2023-10-10 RX ORDER — LEVOCETIRIZINE DIHYDROCHLORIDE 5 MG/1
5 TABLET, FILM COATED ORAL EVERY EVENING
Qty: 30 TABLET | Refills: 0 | Status: SHIPPED | OUTPATIENT
Start: 2023-10-10 | End: 2024-01-10 | Stop reason: WASHOUT

## 2023-10-10 RX ORDER — AZELASTINE 1 MG/ML
1 SPRAY, METERED NASAL 2 TIMES DAILY
Qty: 30 ML | Refills: 1 | Status: SHIPPED | OUTPATIENT
Start: 2023-10-10 | End: 2024-10-09

## 2023-10-10 ASSESSMENT — PATIENT HEALTH QUESTIONNAIRE - PHQ9
SUM OF ALL RESPONSES TO PHQ9 QUESTIONS 1 AND 2: 0
1. LITTLE INTEREST OR PLEASURE IN DOING THINGS: NOT AT ALL
2. FEELING DOWN, DEPRESSED OR HOPELESS: NOT AT ALL

## 2023-10-10 ASSESSMENT — COLUMBIA-SUICIDE SEVERITY RATING SCALE - C-SSRS
6. HAVE YOU EVER DONE ANYTHING, STARTED TO DO ANYTHING, OR PREPARED TO DO ANYTHING TO END YOUR LIFE?: NO
2. HAVE YOU ACTUALLY HAD ANY THOUGHTS OF KILLING YOURSELF?: NO
1. IN THE PAST MONTH, HAVE YOU WISHED YOU WERE DEAD OR WISHED YOU COULD GO TO SLEEP AND NOT WAKE UP?: NO

## 2023-10-10 ASSESSMENT — ENCOUNTER SYMPTOMS
OCCASIONAL FEELINGS OF UNSTEADINESS: 0
LOSS OF SENSATION IN FEET: 0
DEPRESSION: 0

## 2023-10-10 NOTE — PROGRESS NOTES
"Subjective   Patient ID: Bridgette Moody is a 82 y.o. female who presents for Follow-up (From hospital ) and Med Refill (Hydrocortisone at [facility] ).    HPI     Review of Systems    Objective   /74   Pulse 79   Temp 36.8 °C (98.2 °F)   Ht 1.499 m (4' 11\")   Wt 54.9 kg (121 lb)   SpO2 98%   BMI 24.44 kg/m²     Physical Exam    Assessment/Plan          "

## 2023-10-10 NOTE — PROGRESS NOTES
"Subjective   Patient ID: Bridgette Moody is a 82 y.o. female who presents for Follow-up (From hospital ) and Med Refill (Hydrocortisone at [facility] ).    HPI patient presents to clinic complaining of  sinus drainage for the past 2 weeks.  She is also here to review her laboratory studies.  She denies any fever, chills, headache, cough congestion and shortness of breath. Her  diarrhea seems to have resolved.  Laboratory studies done shows serum creatinine of 1.18 GFR of 46 mL/min ,, hemoglobin of 13.2 and other studies are unremarkable.  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        Review of Systems   Constitutional: Negative.    HENT:  Positive for postnasal drip.    Eyes: Negative.    Respiratory: Negative.     Cardiovascular: Negative.    Gastrointestinal: Negative.    Endocrine: Negative.    Genitourinary: Negative.    Musculoskeletal: Negative.    Skin: Negative.    Allergic/Immunologic: Negative.    Neurological: Negative.    Hematological: Negative.    Psychiatric/Behavioral: Negative.         Objective   /74   Pulse 79   Temp 36.8 °C (98.2 °F)   Ht 1.499 m (4' 11\")   Wt 54.9 kg (121 lb)   SpO2 98%   BMI 24.44 kg/m²     Physical Exam  Constitutional:       Appearance: Normal appearance. She is normal weight.   HENT:      Right Ear: Tympanic membrane normal.      Left Ear: Tympanic membrane and ear canal normal.      Nose: Nose normal.   Neck:      Vascular: No carotid bruit.   Cardiovascular:      Rate and Rhythm: Normal rate.   Pulmonary:      Effort: No respiratory distress.      Breath sounds: No stridor. No wheezing.   Abdominal:      Palpations: Abdomen is soft.      Tenderness: There is no guarding or rebound.   Skin:     Coloration: Skin is not jaundiced.      Findings: No bruising.   Neurological:      General: No focal deficit " present.      Mental Status: She is alert and oriented to person, place, and time.   Psychiatric:         Mood and Affect: Mood normal.         Assessment/Plan    patient is encouraged drink fluids and is given trial with Astelin nasal spray and levocetirizine regarding postnasal drip.  She will continue other medications and will return to clinic in 3 months for follow-up visit.

## 2023-10-12 ASSESSMENT — ENCOUNTER SYMPTOMS
PSYCHIATRIC NEGATIVE: 1
RESPIRATORY NEGATIVE: 1
HEMATOLOGIC/LYMPHATIC NEGATIVE: 1
ENDOCRINE NEGATIVE: 1
ALLERGIC/IMMUNOLOGIC NEGATIVE: 1
MUSCULOSKELETAL NEGATIVE: 1
CONSTITUTIONAL NEGATIVE: 1
CARDIOVASCULAR NEGATIVE: 1
GASTROINTESTINAL NEGATIVE: 1
NEUROLOGICAL NEGATIVE: 1
EYES NEGATIVE: 1

## 2023-10-19 DIAGNOSIS — E23.0 ACTH DEFICIENCY (MULTI): ICD-10-CM

## 2023-10-19 RX ORDER — HYDROCORTISONE 5 MG/1
5 TABLET ORAL EVERY 12 HOURS
Qty: 180 TABLET | Refills: 1 | Status: SHIPPED | OUTPATIENT
Start: 2023-10-19 | End: 2024-02-07 | Stop reason: WASHOUT

## 2023-10-20 ENCOUNTER — OFFICE VISIT (OUTPATIENT)
Dept: PRIMARY CARE | Facility: CLINIC | Age: 83
End: 2023-10-20
Payer: MEDICARE

## 2023-10-20 VITALS
OXYGEN SATURATION: 97 % | BODY MASS INDEX: 23.59 KG/M2 | WEIGHT: 117 LBS | HEART RATE: 69 BPM | HEIGHT: 59 IN | DIASTOLIC BLOOD PRESSURE: 71 MMHG | TEMPERATURE: 97.6 F | SYSTOLIC BLOOD PRESSURE: 119 MMHG

## 2023-10-20 DIAGNOSIS — E23.0: Primary | ICD-10-CM

## 2023-10-20 DIAGNOSIS — J06.9 UPPER RESPIRATORY TRACT INFECTION, UNSPECIFIED TYPE: ICD-10-CM

## 2023-10-20 DIAGNOSIS — R41.9 COGNITIVE SAFETY ISSUE: ICD-10-CM

## 2023-10-20 DIAGNOSIS — M81.0 AGE-RELATED OSTEOPOROSIS WITHOUT CURRENT PATHOLOGICAL FRACTURE: ICD-10-CM

## 2023-10-20 DIAGNOSIS — E03.9 HYPOTHYROIDISM, UNSPECIFIED TYPE: ICD-10-CM

## 2023-10-20 PROCEDURE — 3074F SYST BP LT 130 MM HG: CPT | Performed by: INTERNAL MEDICINE

## 2023-10-20 PROCEDURE — 1126F AMNT PAIN NOTED NONE PRSNT: CPT | Performed by: INTERNAL MEDICINE

## 2023-10-20 PROCEDURE — 1160F RVW MEDS BY RX/DR IN RCRD: CPT | Performed by: INTERNAL MEDICINE

## 2023-10-20 PROCEDURE — 1036F TOBACCO NON-USER: CPT | Performed by: INTERNAL MEDICINE

## 2023-10-20 PROCEDURE — 3078F DIAST BP <80 MM HG: CPT | Performed by: INTERNAL MEDICINE

## 2023-10-20 PROCEDURE — 1159F MED LIST DOCD IN RCRD: CPT | Performed by: INTERNAL MEDICINE

## 2023-10-20 PROCEDURE — 99213 OFFICE O/P EST LOW 20 MIN: CPT | Performed by: INTERNAL MEDICINE

## 2023-10-20 RX ORDER — BENZONATATE 100 MG/1
100 CAPSULE ORAL 3 TIMES DAILY PRN
Qty: 21 CAPSULE | Refills: 0 | Status: SHIPPED | OUTPATIENT
Start: 2023-10-20 | End: 2023-11-03 | Stop reason: HOSPADM

## 2023-10-20 RX ORDER — CEFDINIR 300 MG/1
300 CAPSULE ORAL 2 TIMES DAILY
Qty: 14 CAPSULE | Refills: 0 | Status: SHIPPED | OUTPATIENT
Start: 2023-10-20 | End: 2023-11-03 | Stop reason: HOSPADM

## 2023-10-20 ASSESSMENT — ENCOUNTER SYMPTOMS
OCCASIONAL FEELINGS OF UNSTEADINESS: 0
LOSS OF SENSATION IN FEET: 0
DEPRESSION: 0

## 2023-10-20 ASSESSMENT — COLUMBIA-SUICIDE SEVERITY RATING SCALE - C-SSRS
1. IN THE PAST MONTH, HAVE YOU WISHED YOU WERE DEAD OR WISHED YOU COULD GO TO SLEEP AND NOT WAKE UP?: NO
6. HAVE YOU EVER DONE ANYTHING, STARTED TO DO ANYTHING, OR PREPARED TO DO ANYTHING TO END YOUR LIFE?: NO
2. HAVE YOU ACTUALLY HAD ANY THOUGHTS OF KILLING YOURSELF?: NO

## 2023-10-20 NOTE — PROGRESS NOTES
"Subjective   Patient ID: Bridgette Moody is a 82 y.o. female who presents for Follow-up (From hospital /Lost of appetite ) and Med Refill (Hydrocortisone ).    HPI     Review of Systems    Objective   /71   Pulse 69   Temp 36.4 °C (97.6 °F)   Ht 1.499 m (4' 11\")   Wt 53.1 kg (117 lb)   SpO2 97%   BMI 23.63 kg/m²     Physical Exam    Assessment/Plan          "

## 2023-10-20 NOTE — PROGRESS NOTES
"Subjective   Patient ID: Bridgette Moody is a 82 y.o. female who presents for Follow-up (From hospital /Lost of appetite ) and Med Refill (Hydrocortisone ).    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     Review of Systems     Review of Systems   Constitutional: Negative.    HENT:  Positive for congestion.    Eyes: Negative.    Respiratory:  Positive for cough.    Cardiovascular: Negative.    Gastrointestinal: Negative.    Endocrine: Negative.    Genitourinary: Negative.    Musculoskeletal: Negative.    Skin: Negative.    Allergic/Immunologic: Negative.    Neurological: Negative.    Hematological: Negative.    Psychiatric/Behavioral: Negative.         Objective   /71   Pulse 69   Temp 36.4 °C (97.6 °F)   Ht 1.499 m (4' 11\")   Wt 53.1 kg (117 lb)   SpO2 97%   BMI 23.63 kg/m²     Physical Exam  Constitutional:       Appearance: Normal appearance. She is normal weight.   HENT:      Right Ear: Tympanic membrane normal.      Left Ear: Tympanic membrane and ear canal normal.      Nose: Nose normal.   Neck:      Vascular: No carotid bruit.   Cardiovascular:      Rate and Rhythm: Normal rate.   Pulmonary:      Effort: No respiratory distress.      Breath sounds: No stridor. No wheezing.   Abdominal:      Palpations: Abdomen is soft.      Tenderness: There is no guarding or rebound.   Skin:     Coloration: Skin is not jaundiced.   Neurological:      General: No focal deficit present.      Mental Status: She is alert and oriented to person, place, and time. "   Psychiatric:         Mood and Affect: Mood normal.         Assessment/Plan   Patient will be referred to geriatrician for geriateric assessment.She will be started on omnicef and tessalon capsules s for URI. She is strongly advised to consider moving closer to her son because of safety issues and risk of falls.She will continue current other meds and will return to her scheduled appointment .

## 2023-10-22 ASSESSMENT — ENCOUNTER SYMPTOMS
CARDIOVASCULAR NEGATIVE: 1
EYES NEGATIVE: 1
PSYCHIATRIC NEGATIVE: 1
HEMATOLOGIC/LYMPHATIC NEGATIVE: 1
NEUROLOGICAL NEGATIVE: 1
GASTROINTESTINAL NEGATIVE: 1
COUGH: 1
MUSCULOSKELETAL NEGATIVE: 1
ALLERGIC/IMMUNOLOGIC NEGATIVE: 1
CONSTITUTIONAL NEGATIVE: 1
ENDOCRINE NEGATIVE: 1

## 2023-10-27 ENCOUNTER — OFFICE VISIT (OUTPATIENT)
Dept: PRIMARY CARE | Facility: CLINIC | Age: 83
End: 2023-10-27
Payer: MEDICARE

## 2023-10-27 VITALS
OXYGEN SATURATION: 94 % | HEART RATE: 77 BPM | SYSTOLIC BLOOD PRESSURE: 127 MMHG | BODY MASS INDEX: 23.59 KG/M2 | HEIGHT: 59 IN | DIASTOLIC BLOOD PRESSURE: 74 MMHG | WEIGHT: 117 LBS

## 2023-10-27 DIAGNOSIS — Z48.02: Primary | ICD-10-CM

## 2023-10-27 PROCEDURE — 3074F SYST BP LT 130 MM HG: CPT | Performed by: INTERNAL MEDICINE

## 2023-10-27 PROCEDURE — 1126F AMNT PAIN NOTED NONE PRSNT: CPT | Performed by: INTERNAL MEDICINE

## 2023-10-27 PROCEDURE — 1159F MED LIST DOCD IN RCRD: CPT | Performed by: INTERNAL MEDICINE

## 2023-10-27 PROCEDURE — 1036F TOBACCO NON-USER: CPT | Performed by: INTERNAL MEDICINE

## 2023-10-27 PROCEDURE — 3078F DIAST BP <80 MM HG: CPT | Performed by: INTERNAL MEDICINE

## 2023-10-27 PROCEDURE — 1160F RVW MEDS BY RX/DR IN RCRD: CPT | Performed by: INTERNAL MEDICINE

## 2023-10-27 PROCEDURE — 99212 OFFICE O/P EST SF 10 MIN: CPT | Performed by: INTERNAL MEDICINE

## 2023-10-27 ASSESSMENT — ENCOUNTER SYMPTOMS
ENDOCRINE NEGATIVE: 1
CARDIOVASCULAR NEGATIVE: 1
CONSTITUTIONAL NEGATIVE: 1
EYES NEGATIVE: 1
MUSCULOSKELETAL NEGATIVE: 1
ALLERGIC/IMMUNOLOGIC NEGATIVE: 1
PSYCHIATRIC NEGATIVE: 1
HEMATOLOGIC/LYMPHATIC NEGATIVE: 1
NEUROLOGICAL NEGATIVE: 1
GASTROINTESTINAL NEGATIVE: 1
RESPIRATORY NEGATIVE: 1

## 2023-10-27 NOTE — PROGRESS NOTES
"Subjective   Patient ID: Bridgette Moody is a 82 y.o. female who presents for Suture / Staple Removal.    HPI     Review of Systems    Objective   /74   Pulse 77   Ht 1.499 m (4' 11\")   Wt 53.1 kg (117 lb)   SpO2 94%   BMI 23.63 kg/m²     Physical Exam    Assessment/Plan          "

## 2023-10-27 NOTE — PROGRESS NOTES
"Subjective   Patient ID: Bridgette Moody is a 82 y.o. female who presents for Suture / Staple Removal.    HPI patient presents to clinic for suture removal.  She is otherwise doing well and offers no other complaints.  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        Review of Systems   Constitutional: Negative.    HENT: Negative.     Eyes: Negative.    Respiratory: Negative.     Cardiovascular: Negative.    Gastrointestinal: Negative.    Endocrine: Negative.    Genitourinary: Negative.    Musculoskeletal: Negative.    Skin: Negative.    Allergic/Immunologic: Negative.    Neurological: Negative.    Hematological: Negative.    Psychiatric/Behavioral: Negative.         Objective   /74   Pulse 77   Ht 1.499 m (4' 11\")   Wt 53.1 kg (117 lb)   SpO2 94%   BMI 23.63 kg/m²     Physical Exam  Constitutional:       Appearance: Normal appearance. She is normal weight.   HENT:      Head:      Comments: Suture site from scalp area looks clean and dry without any erythema or drainage     Right Ear: Tympanic membrane normal.      Left Ear: Tympanic membrane and ear canal normal.      Nose: Nose normal.   Neck:      Vascular: No carotid bruit.   Cardiovascular:      Rate and Rhythm: Normal rate.   Pulmonary:      Effort: No respiratory distress.      Breath sounds: No stridor. No wheezing.   Abdominal:      Palpations: Abdomen is soft.      Tenderness: There is no abdominal tenderness. There is no guarding or rebound.   Skin:     Coloration: Skin is not jaundiced.   Neurological:      General: No focal deficit present.      Mental Status: She is alert and oriented to person, place, and time.   Psychiatric:         Mood and Affect: Mood normal.         Assessment/Plan    patient suture removal from the scalp area without any major bleeding or bruising of the " skin noticed.  She will continue home medications and will return to her scheduled appointment.

## 2023-11-02 ENCOUNTER — APPOINTMENT (OUTPATIENT)
Dept: RADIOLOGY | Facility: HOSPITAL | Age: 83
End: 2023-11-02
Payer: MEDICARE

## 2023-11-02 ENCOUNTER — APPOINTMENT (OUTPATIENT)
Dept: PRIMARY CARE | Facility: CLINIC | Age: 83
End: 2023-11-02
Payer: MEDICARE

## 2023-11-02 ENCOUNTER — HOSPITAL ENCOUNTER (OUTPATIENT)
Facility: HOSPITAL | Age: 83
Setting detail: OBSERVATION
Discharge: HOME | End: 2023-11-03
Attending: EMERGENCY MEDICINE | Admitting: INTERNAL MEDICINE
Payer: MEDICARE

## 2023-11-02 DIAGNOSIS — R53.1 WEAKNESS: ICD-10-CM

## 2023-11-02 DIAGNOSIS — E87.1 HYPONATREMIA: ICD-10-CM

## 2023-11-02 DIAGNOSIS — R79.89 LACTATE BLOOD INCREASE: Primary | ICD-10-CM

## 2023-11-02 DIAGNOSIS — J69.0: ICD-10-CM

## 2023-11-02 DIAGNOSIS — E87.6 HYPOKALEMIA: ICD-10-CM

## 2023-11-02 DIAGNOSIS — R09.82 POST-NASAL DRIP: ICD-10-CM

## 2023-11-02 DIAGNOSIS — E83.42 HYPOMAGNESEMIA: ICD-10-CM

## 2023-11-02 LAB
ALBUMIN SERPL BCP-MCNC: 4 G/DL (ref 3.4–5)
ALP SERPL-CCNC: 58 U/L (ref 33–136)
ALT SERPL W P-5'-P-CCNC: 14 U/L (ref 7–45)
ANION GAP SERPL CALC-SCNC: 14 MMOL/L (ref 10–20)
APPEARANCE UR: CLEAR
AST SERPL W P-5'-P-CCNC: 44 U/L (ref 9–39)
BASOPHILS # BLD AUTO: 0.04 X10*3/UL (ref 0–0.1)
BASOPHILS NFR BLD AUTO: 0.8 %
BILIRUB SERPL-MCNC: 1 MG/DL (ref 0–1.2)
BILIRUB UR STRIP.AUTO-MCNC: NEGATIVE MG/DL
BUN SERPL-MCNC: 8 MG/DL (ref 6–23)
CALCIUM SERPL-MCNC: 9.6 MG/DL (ref 8.6–10.3)
CARDIAC TROPONIN I PNL SERPL HS: 5 NG/L (ref 0–13)
CHLORIDE SERPL-SCNC: 91 MMOL/L (ref 98–107)
CK SERPL-CCNC: 40 U/L (ref 0–215)
CO2 SERPL-SCNC: 24 MMOL/L (ref 21–32)
COLOR UR: ABNORMAL
CREAT SERPL-MCNC: 0.89 MG/DL (ref 0.5–1.05)
EOSINOPHIL # BLD AUTO: 0.29 X10*3/UL (ref 0–0.4)
EOSINOPHIL NFR BLD AUTO: 5.5 %
ERYTHROCYTE [DISTWIDTH] IN BLOOD BY AUTOMATED COUNT: 13.2 % (ref 11.5–14.5)
GFR SERPL CREATININE-BSD FRML MDRD: 65 ML/MIN/1.73M*2
GLUCOSE SERPL-MCNC: 105 MG/DL (ref 74–99)
GLUCOSE UR STRIP.AUTO-MCNC: NEGATIVE MG/DL
HCT VFR BLD AUTO: 42.1 % (ref 36–46)
HGB BLD-MCNC: 14.5 G/DL (ref 12–16)
IMM GRANULOCYTES # BLD AUTO: 0.01 X10*3/UL (ref 0–0.5)
IMM GRANULOCYTES NFR BLD AUTO: 0.2 % (ref 0–0.9)
KETONES UR STRIP.AUTO-MCNC: ABNORMAL MG/DL
LACTATE SERPL-SCNC: 1.3 MMOL/L (ref 0.4–2)
LACTATE SERPL-SCNC: 1.4 MMOL/L (ref 0.4–2)
LACTATE SERPL-SCNC: 2.2 MMOL/L (ref 0.4–2)
LEUKOCYTE ESTERASE UR QL STRIP.AUTO: NEGATIVE
LIPASE SERPL-CCNC: 30 U/L (ref 9–82)
LYMPHOCYTES # BLD AUTO: 1.91 X10*3/UL (ref 0.8–3)
LYMPHOCYTES NFR BLD AUTO: 36.5 %
MAGNESIUM SERPL-MCNC: 1.41 MG/DL (ref 1.6–2.4)
MCH RBC QN AUTO: 27.9 PG (ref 26–34)
MCHC RBC AUTO-ENTMCNC: 34.4 G/DL (ref 32–36)
MCV RBC AUTO: 81 FL (ref 80–100)
MONOCYTES # BLD AUTO: 0.44 X10*3/UL (ref 0.05–0.8)
MONOCYTES NFR BLD AUTO: 8.4 %
NEUTROPHILS # BLD AUTO: 2.55 X10*3/UL (ref 1.6–5.5)
NEUTROPHILS NFR BLD AUTO: 48.6 %
NITRITE UR QL STRIP.AUTO: NEGATIVE
NRBC BLD-RTO: 0 /100 WBCS (ref 0–0)
PH UR STRIP.AUTO: 6 [PH]
PLATELET # BLD AUTO: 375 X10*3/UL (ref 150–450)
POTASSIUM SERPL-SCNC: 3 MMOL/L (ref 3.5–5.3)
PROT SERPL-MCNC: 7.5 G/DL (ref 6.4–8.2)
PROT UR STRIP.AUTO-MCNC: NEGATIVE MG/DL
RBC # BLD AUTO: 5.19 X10*6/UL (ref 4–5.2)
RBC # UR STRIP.AUTO: NEGATIVE /UL
SARS-COV-2 RNA RESP QL NAA+PROBE: NOT DETECTED
SODIUM SERPL-SCNC: 126 MMOL/L (ref 136–145)
SP GR UR STRIP.AUTO: 1
T4 FREE SERPL-MCNC: 0.9 NG/DL (ref 0.61–1.12)
TSH SERPL-ACNC: 0.11 MIU/L (ref 0.44–3.98)
UROBILINOGEN UR STRIP.AUTO-MCNC: <2 MG/DL
WBC # BLD AUTO: 5.2 X10*3/UL (ref 4.4–11.3)

## 2023-11-02 PROCEDURE — 71045 X-RAY EXAM CHEST 1 VIEW: CPT | Mod: FOREIGN READ | Performed by: RADIOLOGY

## 2023-11-02 PROCEDURE — 96367 TX/PROPH/DG ADDL SEQ IV INF: CPT | Performed by: EMERGENCY MEDICINE

## 2023-11-02 PROCEDURE — 80053 COMPREHEN METABOLIC PANEL: CPT | Performed by: NURSE PRACTITIONER

## 2023-11-02 PROCEDURE — 99223 1ST HOSP IP/OBS HIGH 75: CPT

## 2023-11-02 PROCEDURE — 87075 CULTR BACTERIA EXCEPT BLOOD: CPT | Mod: GEALAB | Performed by: NURSE PRACTITIONER

## 2023-11-02 PROCEDURE — 81003 URINALYSIS AUTO W/O SCOPE: CPT

## 2023-11-02 PROCEDURE — 96372 THER/PROPH/DIAG INJ SC/IM: CPT

## 2023-11-02 PROCEDURE — 71045 X-RAY EXAM CHEST 1 VIEW: CPT | Mod: FY

## 2023-11-02 PROCEDURE — 36415 COLL VENOUS BLD VENIPUNCTURE: CPT | Performed by: NURSE PRACTITIONER

## 2023-11-02 PROCEDURE — 2500000004 HC RX 250 GENERAL PHARMACY W/ HCPCS (ALT 636 FOR OP/ED): Performed by: NURSE PRACTITIONER

## 2023-11-02 PROCEDURE — 96375 TX/PRO/DX INJ NEW DRUG ADDON: CPT | Performed by: EMERGENCY MEDICINE

## 2023-11-02 PROCEDURE — 83690 ASSAY OF LIPASE: CPT | Performed by: NURSE PRACTITIONER

## 2023-11-02 PROCEDURE — 84443 ASSAY THYROID STIM HORMONE: CPT

## 2023-11-02 PROCEDURE — 2500000001 HC RX 250 WO HCPCS SELF ADMINISTERED DRUGS (ALT 637 FOR MEDICARE OP)

## 2023-11-02 PROCEDURE — 87635 SARS-COV-2 COVID-19 AMP PRB: CPT | Performed by: NURSE PRACTITIONER

## 2023-11-02 PROCEDURE — 83605 ASSAY OF LACTIC ACID: CPT | Mod: 59 | Performed by: NURSE PRACTITIONER

## 2023-11-02 PROCEDURE — 83605 ASSAY OF LACTIC ACID: CPT

## 2023-11-02 PROCEDURE — 85025 COMPLETE CBC W/AUTO DIFF WBC: CPT | Performed by: NURSE PRACTITIONER

## 2023-11-02 PROCEDURE — 84145 PROCALCITONIN (PCT): CPT | Mod: GEALAB

## 2023-11-02 PROCEDURE — 99285 EMERGENCY DEPT VISIT HI MDM: CPT | Mod: 25 | Performed by: EMERGENCY MEDICINE

## 2023-11-02 PROCEDURE — 74176 CT ABD & PELVIS W/O CONTRAST: CPT

## 2023-11-02 PROCEDURE — 87040 BLOOD CULTURE FOR BACTERIA: CPT | Mod: 59,GEALAB | Performed by: NURSE PRACTITIONER

## 2023-11-02 PROCEDURE — 84439 ASSAY OF FREE THYROXINE: CPT

## 2023-11-02 PROCEDURE — 2500000004 HC RX 250 GENERAL PHARMACY W/ HCPCS (ALT 636 FOR OP/ED): Performed by: INTERNAL MEDICINE

## 2023-11-02 PROCEDURE — 96365 THER/PROPH/DIAG IV INF INIT: CPT | Performed by: EMERGENCY MEDICINE

## 2023-11-02 PROCEDURE — 83735 ASSAY OF MAGNESIUM: CPT | Performed by: NURSE PRACTITIONER

## 2023-11-02 PROCEDURE — 74176 CT ABD & PELVIS W/O CONTRAST: CPT | Mod: FOREIGN READ | Performed by: RADIOLOGY

## 2023-11-02 PROCEDURE — 82550 ASSAY OF CK (CPK): CPT | Performed by: NURSE PRACTITIONER

## 2023-11-02 PROCEDURE — 2500000004 HC RX 250 GENERAL PHARMACY W/ HCPCS (ALT 636 FOR OP/ED)

## 2023-11-02 PROCEDURE — G0378 HOSPITAL OBSERVATION PER HR: HCPCS

## 2023-11-02 PROCEDURE — 84484 ASSAY OF TROPONIN QUANT: CPT | Performed by: NURSE PRACTITIONER

## 2023-11-02 PROCEDURE — 2500000001 HC RX 250 WO HCPCS SELF ADMINISTERED DRUGS (ALT 637 FOR MEDICARE OP): Performed by: NURSE PRACTITIONER

## 2023-11-02 RX ORDER — MULTIVIT-MIN/IRON FUM/FOLIC AC 7.5 MG-4
1 TABLET ORAL DAILY
Status: DISCONTINUED | OUTPATIENT
Start: 2023-11-02 | End: 2023-11-03 | Stop reason: HOSPADM

## 2023-11-02 RX ORDER — ONDANSETRON HYDROCHLORIDE 2 MG/ML
4 INJECTION, SOLUTION INTRAVENOUS EVERY 6 HOURS PRN
Status: DISCONTINUED | OUTPATIENT
Start: 2023-11-02 | End: 2023-11-03 | Stop reason: HOSPADM

## 2023-11-02 RX ORDER — POTASSIUM CHLORIDE 1.5 G/1.58G
20 POWDER, FOR SOLUTION ORAL 2 TIMES DAILY
Status: DISCONTINUED | OUTPATIENT
Start: 2023-11-02 | End: 2023-11-03 | Stop reason: HOSPADM

## 2023-11-02 RX ORDER — ALBUTEROL SULFATE 0.83 MG/ML
2.5 SOLUTION RESPIRATORY (INHALATION) EVERY 6 HOURS PRN
Status: DISCONTINUED | OUTPATIENT
Start: 2023-11-02 | End: 2023-11-02

## 2023-11-02 RX ORDER — ONDANSETRON HYDROCHLORIDE 2 MG/ML
4 INJECTION, SOLUTION INTRAVENOUS ONCE
Status: DISCONTINUED | OUTPATIENT
Start: 2023-11-02 | End: 2023-11-03 | Stop reason: HOSPADM

## 2023-11-02 RX ORDER — ONDANSETRON HYDROCHLORIDE 2 MG/ML
4 INJECTION, SOLUTION INTRAVENOUS ONCE
Status: COMPLETED | OUTPATIENT
Start: 2023-11-02 | End: 2023-11-02

## 2023-11-02 RX ORDER — MIRTAZAPINE 15 MG/1
7.5 TABLET, FILM COATED ORAL NIGHTLY
Status: DISCONTINUED | OUTPATIENT
Start: 2023-11-02 | End: 2023-11-03 | Stop reason: HOSPADM

## 2023-11-02 RX ORDER — AMLODIPINE BESYLATE 5 MG/1
5 TABLET ORAL DAILY
Status: DISCONTINUED | OUTPATIENT
Start: 2023-11-02 | End: 2023-11-03 | Stop reason: HOSPADM

## 2023-11-02 RX ORDER — AMOXICILLIN AND CLAVULANATE POTASSIUM 875; 125 MG/1; MG/1
875 TABLET, FILM COATED ORAL EVERY 12 HOURS SCHEDULED
Status: DISCONTINUED | OUTPATIENT
Start: 2023-11-03 | End: 2023-11-03

## 2023-11-02 RX ORDER — LEVOTHYROXINE SODIUM 25 UG/1
25 TABLET ORAL DAILY
Status: DISCONTINUED | OUTPATIENT
Start: 2023-11-03 | End: 2023-11-03 | Stop reason: HOSPADM

## 2023-11-02 RX ORDER — ACETAMINOPHEN 160 MG/5ML
650 SOLUTION ORAL EVERY 4 HOURS PRN
Status: DISCONTINUED | OUTPATIENT
Start: 2023-11-02 | End: 2023-11-03 | Stop reason: HOSPADM

## 2023-11-02 RX ORDER — HEPARIN SODIUM 5000 [USP'U]/ML
5000 INJECTION, SOLUTION INTRAVENOUS; SUBCUTANEOUS EVERY 8 HOURS SCHEDULED
Status: DISCONTINUED | OUTPATIENT
Start: 2023-11-02 | End: 2023-11-03 | Stop reason: HOSPADM

## 2023-11-02 RX ORDER — HYDROCORTISONE 10 MG/1
5 TABLET ORAL EVERY 12 HOURS
Status: DISCONTINUED | OUTPATIENT
Start: 2023-11-02 | End: 2023-11-03 | Stop reason: HOSPADM

## 2023-11-02 RX ORDER — ALBUTEROL SULFATE 0.83 MG/ML
2.5 SOLUTION RESPIRATORY (INHALATION) EVERY 2 HOUR PRN
Status: DISCONTINUED | OUTPATIENT
Start: 2023-11-02 | End: 2023-11-03 | Stop reason: HOSPADM

## 2023-11-02 RX ORDER — BISMUTH SUBSALICYLATE 262 MG
1 TABLET,CHEWABLE ORAL DAILY
COMMUNITY

## 2023-11-02 RX ORDER — POTASSIUM CHLORIDE 20 MEQ/1
20 TABLET, EXTENDED RELEASE ORAL ONCE
Status: COMPLETED | OUTPATIENT
Start: 2023-11-02 | End: 2023-11-02

## 2023-11-02 RX ORDER — MAGNESIUM SULFATE HEPTAHYDRATE 40 MG/ML
2 INJECTION, SOLUTION INTRAVENOUS ONCE
Status: COMPLETED | OUTPATIENT
Start: 2023-11-02 | End: 2023-11-02

## 2023-11-02 RX ORDER — CEFTRIAXONE 1 G/50ML
1 INJECTION, SOLUTION INTRAVENOUS ONCE
Status: COMPLETED | OUTPATIENT
Start: 2023-11-02 | End: 2023-11-02

## 2023-11-02 RX ORDER — ACETAMINOPHEN 325 MG/1
650 TABLET ORAL EVERY 4 HOURS PRN
Status: DISCONTINUED | OUTPATIENT
Start: 2023-11-02 | End: 2023-11-03 | Stop reason: HOSPADM

## 2023-11-02 RX ORDER — SODIUM CHLORIDE, SODIUM LACTATE, POTASSIUM CHLORIDE, CALCIUM CHLORIDE 600; 310; 30; 20 MG/100ML; MG/100ML; MG/100ML; MG/100ML
100 INJECTION, SOLUTION INTRAVENOUS CONTINUOUS
Status: DISCONTINUED | OUTPATIENT
Start: 2023-11-02 | End: 2023-11-03 | Stop reason: HOSPADM

## 2023-11-02 RX ADMIN — MIRTAZAPINE 7.5 MG: 15 TABLET, FILM COATED ORAL at 20:15

## 2023-11-02 RX ADMIN — MAGNESIUM SULFATE HEPTAHYDRATE 2 G: 2 INJECTION, SOLUTION INTRAVENOUS at 13:07

## 2023-11-02 RX ADMIN — POTASSIUM CHLORIDE 20 MEQ: 1.5 POWDER, FOR SOLUTION ORAL at 13:06

## 2023-11-02 RX ADMIN — AZITHROMYCIN MONOHYDRATE 500 MG: 500 INJECTION, POWDER, LYOPHILIZED, FOR SOLUTION INTRAVENOUS at 12:35

## 2023-11-02 RX ADMIN — HYDROCORTISONE 5 MG: 10 TABLET ORAL at 17:40

## 2023-11-02 RX ADMIN — CEFTRIAXONE SODIUM 1 G: 1 INJECTION, SOLUTION INTRAVENOUS at 12:35

## 2023-11-02 RX ADMIN — SODIUM CHLORIDE 500 ML: 9 INJECTION, SOLUTION INTRAVENOUS at 11:46

## 2023-11-02 RX ADMIN — ONDANSETRON 4 MG: 2 INJECTION INTRAMUSCULAR; INTRAVENOUS at 11:46

## 2023-11-02 RX ADMIN — POTASSIUM CHLORIDE 20 MEQ: 1.5 POWDER, FOR SOLUTION ORAL at 20:15

## 2023-11-02 RX ADMIN — HEPARIN SODIUM 5000 UNITS: 5000 INJECTION INTRAVENOUS; SUBCUTANEOUS at 17:29

## 2023-11-02 RX ADMIN — AMLODIPINE BESYLATE 5 MG: 5 TABLET ORAL at 17:29

## 2023-11-02 RX ADMIN — ONDANSETRON 4 MG: 2 INJECTION INTRAMUSCULAR; INTRAVENOUS at 17:40

## 2023-11-02 RX ADMIN — POTASSIUM CHLORIDE 20 MEQ: 1500 TABLET, EXTENDED RELEASE ORAL at 20:14

## 2023-11-02 RX ADMIN — SODIUM CHLORIDE, POTASSIUM CHLORIDE, SODIUM LACTATE AND CALCIUM CHLORIDE 100 ML/HR: 600; 310; 30; 20 INJECTION, SOLUTION INTRAVENOUS at 17:29

## 2023-11-02 SDOH — SOCIAL STABILITY: SOCIAL INSECURITY: ABUSE: ADULT

## 2023-11-02 SDOH — SOCIAL STABILITY: SOCIAL INSECURITY: HAS ANYONE EVER THREATENED TO HURT YOUR FAMILY OR YOUR PETS?: NO

## 2023-11-02 SDOH — SOCIAL STABILITY: SOCIAL INSECURITY: HAVE YOU HAD THOUGHTS OF HARMING ANYONE ELSE?: NO

## 2023-11-02 SDOH — SOCIAL STABILITY: SOCIAL INSECURITY: ARE YOU OR HAVE YOU BEEN THREATENED OR ABUSED PHYSICALLY, EMOTIONALLY, OR SEXUALLY BY ANYONE?: NO

## 2023-11-02 SDOH — SOCIAL STABILITY: SOCIAL INSECURITY: DO YOU FEEL ANYONE HAS EXPLOITED OR TAKEN ADVANTAGE OF YOU FINANCIALLY OR OF YOUR PERSONAL PROPERTY?: NO

## 2023-11-02 SDOH — SOCIAL STABILITY: SOCIAL INSECURITY: DOES ANYONE TRY TO KEEP YOU FROM HAVING/CONTACTING OTHER FRIENDS OR DOING THINGS OUTSIDE YOUR HOME?: NO

## 2023-11-02 SDOH — SOCIAL STABILITY: SOCIAL INSECURITY: ARE THERE ANY APPARENT SIGNS OF INJURIES/BEHAVIORS THAT COULD BE RELATED TO ABUSE/NEGLECT?: NO

## 2023-11-02 SDOH — SOCIAL STABILITY: SOCIAL INSECURITY: DO YOU FEEL UNSAFE GOING BACK TO THE PLACE WHERE YOU ARE LIVING?: NO

## 2023-11-02 SDOH — SOCIAL STABILITY: SOCIAL INSECURITY: WERE YOU ABLE TO COMPLETE ALL THE BEHAVIORAL HEALTH SCREENINGS?: YES

## 2023-11-02 ASSESSMENT — COGNITIVE AND FUNCTIONAL STATUS - GENERAL
DAILY ACTIVITIY SCORE: 24
MOBILITY SCORE: 24
PATIENT BASELINE BEDBOUND: NO

## 2023-11-02 ASSESSMENT — COLUMBIA-SUICIDE SEVERITY RATING SCALE - C-SSRS
1. IN THE PAST MONTH, HAVE YOU WISHED YOU WERE DEAD OR WISHED YOU COULD GO TO SLEEP AND NOT WAKE UP?: NO
2. HAVE YOU ACTUALLY HAD ANY THOUGHTS OF KILLING YOURSELF?: NO
6. HAVE YOU EVER DONE ANYTHING, STARTED TO DO ANYTHING, OR PREPARED TO DO ANYTHING TO END YOUR LIFE?: NO

## 2023-11-02 ASSESSMENT — ENCOUNTER SYMPTOMS
APPETITE CHANGE: 1
ACTIVITY CHANGE: 1

## 2023-11-02 ASSESSMENT — ACTIVITIES OF DAILY LIVING (ADL)
WALKS IN HOME: INDEPENDENT
GROOMING: INDEPENDENT
JUDGMENT_ADEQUATE_SAFELY_COMPLETE_DAILY_ACTIVITIES: UNABLE TO ASSESS
BATHING: INDEPENDENT
ADEQUATE_TO_COMPLETE_ADL: YES
HEARING - LEFT EAR: FUNCTIONAL
FEEDING YOURSELF: INDEPENDENT
LACK_OF_TRANSPORTATION: NO
DRESSING YOURSELF: INDEPENDENT
PATIENT'S MEMORY ADEQUATE TO SAFELY COMPLETE DAILY ACTIVITIES?: UNABLE TO ASSESS
LACK_OF_TRANSPORTATION: NO
HEARING - RIGHT EAR: FUNCTIONAL
TOILETING: INDEPENDENT

## 2023-11-02 ASSESSMENT — PAIN SCALES - GENERAL
PAINLEVEL_OUTOF10: 0 - NO PAIN
PAINLEVEL_OUTOF10: 0 - NO PAIN

## 2023-11-02 ASSESSMENT — LIFESTYLE VARIABLES
HOW MANY STANDARD DRINKS CONTAINING ALCOHOL DO YOU HAVE ON A TYPICAL DAY: PATIENT DOES NOT DRINK
AUDIT-C TOTAL SCORE: 0
HOW OFTEN DO YOU HAVE 6 OR MORE DRINKS ON ONE OCCASION: NEVER
EVER HAD A DRINK FIRST THING IN THE MORNING TO STEADY YOUR NERVES TO GET RID OF A HANGOVER: NO
AUDIT-C TOTAL SCORE: 0
SKIP TO QUESTIONS 9-10: 1
HAVE PEOPLE ANNOYED YOU BY CRITICIZING YOUR DRINKING: NO
HOW OFTEN DO YOU HAVE A DRINK CONTAINING ALCOHOL: NEVER
HAVE YOU EVER FELT YOU SHOULD CUT DOWN ON YOUR DRINKING: NO
REASON UNABLE TO ASSESS: NO
EVER FELT BAD OR GUILTY ABOUT YOUR DRINKING: NO

## 2023-11-02 ASSESSMENT — PAIN - FUNCTIONAL ASSESSMENT
PAIN_FUNCTIONAL_ASSESSMENT: 0-10
PAIN_FUNCTIONAL_ASSESSMENT: 0-10

## 2023-11-02 ASSESSMENT — PATIENT HEALTH QUESTIONNAIRE - PHQ9
2. FEELING DOWN, DEPRESSED OR HOPELESS: NOT AT ALL
1. LITTLE INTEREST OR PLEASURE IN DOING THINGS: NOT AT ALL
SUM OF ALL RESPONSES TO PHQ9 QUESTIONS 1 & 2: 0

## 2023-11-02 NOTE — HOSPITAL COURSE
Bridgette Moody is a 82 y.o. female with a past medical history of hypothyroidism, HTN, Delma syndrome, Vitamin D deficiency, osteoporosis, and mild tricuspid/mitral regurgitation who presented to [facility] ED on 23 with progressive weakness and worsening since her last admission to the hospital.  Patient has been having difficulty getting out of bed week also she been feeling sick complaining having some abdominal pain or nausea.  Another main concern is that she has been losing appetite progressively and she has lost about 10 pounds in the last 2 weeks she as she is unable to eat.  she did mention that she also been dehydrated.  Patient also reported having a cough nonproductive in the past few days.  She does have also diarrhea which has been on and off. Of note, patient was recently discharged after admission for rhabdomyolysis    ED COURSE:   VS: T 97.2 F, HR 61, RR 17, /94, SpO2 100% on RA CBC: Unremarkable CMP: Na 126, K 3.0, Cl 91 M.41 Lactate: 2.2   -EKG showed sinus rhythm with first degree AV block CT abd pelvis wo con: Stable mild biliary dilatation measuring 1.4 cm without distal common duct calculus unchanged from prior. Colonic diverticulosis without findings of diverticulitis. Fibrotic changes in the lung bases.  Chest X-ray: Patchy consolidation in the left base, new compared to prior. Possible pneumonia.  ED Interventions: She was given Mag sulfate 2g IV, Zofran 4mg IV, Azithromycin 500mg IV, Rocephin 1g IV, Kcl, and NS 500mL. Admitted to the floor for further observation.      On the floor the patient was admitted for observation.  We transition antibiotics to p.o. Augmentin 875 mg twice daily given chest x-ray showed patchy consolidation of the left base but the patient is afebrile no leukocytosis on the lab only did report a dry cough.  Blood cultures were ordered along with Pro-Nathan, Legionella/strep pneumo urine antigens. For her electrolyte imbalance we repleted them accordingly.   For her generalized weakness and decreased appetite started the patient on mirtazapine 7.5 mg nightly and was placed on maintenance LR at 100 cc/h.  PT OT along with social work and nutrition were consulted.  Patient did report having diarrhea with CT abdomen did show acute diverticulosis without diverticulitis and stool pathogen panel was ordered.  C. difficile was not obtained given and the patient had a recent C. difficile test that was negative. CT abdomen pelvis also showed biliary dilatation measuring 1.4 cm without distal common duct calculus unchanged from prior and would consider MRCP outpatient with GI consult/referral. She was started on thiamine supplements per recommendations of the dietitian. She was discharged home with [facility] on 11/3/23. New scripts at time of discharge: Augmentin BID x4 days (to complete 5 day course), Thiamine 100mg daily (d/t concern for re-feeding syndrome), Mirtazapine 7.5mg daily, and Imodium 2mg QID PRN. Instructed to follow up with PCP.

## 2023-11-02 NOTE — ED PROVIDER NOTES
HPI   No chief complaint on file.      82-year-old female who was just discharged for rhabdomyolysis, dehydration, weakness, and illness presents today with a complaint of weakness, illness, nausea, vomiting, and diarrhea.  Her son who used to be a nursing manager with [employer] [facility] [employer] is bedside with her and he is concerned that his mother cannot take care of herself at home.  When she was last seen at [employer] [facility] and discharged on 9/23 they had recommended that she go to a rehab facility and patient refused choosing instead to be with her .  The son who is bedside feels that she needs admission and plan of care to get to a rehab facility.  Patient is endorsing nausea and vomiting.  She states she is too weak to get out of bed.  She is denying chest pain or dyspnea.  She is endorsing fever and chills.  Diarrhea appears to be controlled and she is not disheveled.  She denies dysuria or hematuria.  She denies melena or hematochezia.  She denies any neuro deficit.  Her NIH is 0.      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    Hypothyroidism, unspecified 07/18/2022    Hypothyroidism    Other disorders of pituitary gland (CMS/[facility]) 11/04/2021    ACTH deficiency    Unspecified hearing loss, unspecified ear 01/24/2021    Hearing impairment     Past Surgical History:   Procedure Laterality Date    OTHER SURGICAL HISTORY  01/24/2021    Cataract surgery    OTHER SURGICAL  HISTORY  01/24/2021    Knee replacement     Family History   Problem Relation Name Age of Onset    Other (Glioblastoma) Father      Cerebral palsy Sister      Other (Malignant Neoplasm) Brother      Other (Rheumatic Heart Disease) Brother       Social History     Tobacco Use    Smoking status: Never    Smokeless tobacco: Never   Substance Use Topics    Alcohol use: Yes    Drug use: Never       Physical Exam   ED Triage Vitals   Temp Pulse Resp BP   -- -- -- --      SpO2 Temp src Heart Rate Source Patient Position   -- -- -- --      BP Location FiO2 (%)     -- --       Physical Exam  Constitutional:       Appearance: She is ill-appearing.   HENT:      Head: Normocephalic and atraumatic.      Right Ear: Tympanic membrane normal.      Left Ear: Tympanic membrane normal.      Nose: Nose normal.      Mouth/Throat:      Mouth: Mucous membranes are dry.   Eyes:      Extraocular Movements: Extraocular movements intact.      Pupils: Pupils are equal, round, and reactive to light.   Cardiovascular:      Rate and Rhythm: Normal rate and regular rhythm.      Pulses: Normal pulses.      Heart sounds: Normal heart sounds.   Pulmonary:      Effort: Respiratory distress present.      Breath sounds: Stridor present.   Abdominal:      General: Abdomen is flat.      Palpations: Abdomen is soft.      Tenderness: There is abdominal tenderness.   Musculoskeletal:         General: Normal range of motion.      Cervical back: Normal range of motion and neck supple.   Skin:     General: Skin is warm and dry.      Capillary Refill: Capillary refill takes less than 2 seconds.   Neurological:      General: No focal deficit present.      Mental Status: She is alert and oriented to person, place, and time.   Psychiatric:         Mood and Affect: Mood normal.         Behavior: Behavior normal.         ED Course & MDM   Diagnoses as of 11/02/23 1258   Lactate blood increase   Aspiration pneumonia of left lower lobe due to vomit (CMS/HCC)    Hyponatremia   Hypokalemia   Hypomagnesemia       Medical Decision Making  Patient received Zofran 4 mg and 1/2 L normal saline and seem to have improved.  Her lactate was elevated to 2.2 and I requested a second lactate.  Her CBC showed no leukocytosis or left shift.  Her lipase was collected.  Magnesium was collected.  Metabolic panel was collected.  CK was collected.  CT abdomen pelvis showed no acute process.  There was stable mild biliary dilation measuring 1.4 cm without a distal common duct calculus unchanged from prior.  Patient may benefit from an MRCP.  There was colonic diverticulosis without diverticulitis.  Fibrotic changes to the lung base.  CT of the abdomen pelvis showed no new abnormality.  The chest x-ray had patchy consolidation in the left base which is new.  This is concerning for either aspirational pneumonia or community-acquired pneumonia.  She has been home in bed for the last 3 weeks and that could also be contributory.  I covered her with ceftriaxone and azithromycin and ordered blood cultures.  EKG was sinus rhythm with a first-degree AV block.  Seen and staffed with attending will be admitted to hospital service once all labs are finalized.  Patient will was experiencing hypomagnesemia with magnesium of 1.41 and she was repleted with 2 g IV magnesium.  Her potassium was also hypokalemic with 3.0 and I repleted with 40 mill equivalent liquid potassium.  She was hyponatremic with a sodium of 124.  Full report from attending to hospital service who accepted patient in observation.  She was also admitted for patchy density noted on the lung base and covered with azithromycin and ceftriaxone and I ordered cultures.  Our plan once patient's admitted in stabilized just to get her into a rehab facility as it appears that she cannot properly care for herself or care for self proper nutrition.  Family was updated.  Seen and staffed with attending.  Lactate was elevated 2.2 and I ordered another  half liter only of normal saline to be careful with fluid resuscitation and requested a second lactate.    Amount and/or Complexity of Data Reviewed  ECG/medicine tests: ordered and independent interpretation performed.     Details: EKG is 70 bpm completed at 1143.  Left axis.  P waves tied to the QRS.  First-degree AV block.  SD interval is prolonged at 236 ms.  QT corrected is within limits at 455 ms.  No ST elevation or depression.  Interpreted both by attending and myself.        Procedure  Procedures     HERMELINDO Araujo-CNP  11/02/23 1300

## 2023-11-02 NOTE — PROGRESS NOTES
Pharmacy Medication History Review    Bridgette Moody is a 82 y.o. female admitted for Hyponatremia. Pharmacy reviewed the patient's [id number] medications and allergies for accuracy.    The list below reflectives the updated PTA list. Please review each medication in order reconciliation for additional clarification and justification.  (Not in a hospital admission)       The list below reflectives the updated allergy list. Please review each documented allergy for additional clarification and justification.  Allergies  Reviewed by Inna Wheeler CPhT on 11/2/2023   No Known Allergies         Below are additional concerns with the patient's PTA list.      Inna Wheeler CPhT

## 2023-11-02 NOTE — H&P
History Of Present Illness  Bridgette Moody is a 82 y.o. female with a past medical history of hypothyroidism, HTN, Delma syndrome, Vitamin D deficiency, osteoporosis, and mild tricuspid/mitral regurgitation who presented to [facility] ED on 23 with progressive weakness and worsening since her last admission to the hospital.  Patient has been having difficulty getting out of bed week also she been feeling sick complaining having some abdominal pain or nausea.  Another main concern is that she has been losing appetite progressively and she has lost about 10 pounds in the last 2 weeks she as she is unable to eat.  she did mention that she also been dehydrated.  Patient also reported having a cough nonproductive in the past few days.  She does have also diarrhea which has been on and off.Son states symptoms have been present since sick almost 6 weeks ago with the poor appetite, diarrhea at times, decreased physical conditioning. She refused SNF and then HHC per the son but now she is willing to reconsider SNF if recommended.     Of note, patient was recently discharged after admission for rhabdomyolysis    12 Point ROS negative unless otherwise specified above.     ED COURSE:   VS: T 97.2 F, HR 61, RR 17, /94, SpO2 100% on RA     Labs  - CBC: Unremarkable   - CMP: Na 126, K 3.0, Cl 91  - M.41   - Lactate: 2.2     Imaging:  - EKG showed sinus rhythm with first degree AV block  - CT abd pelvis wo con: Stable mild biliary dilatation measuring 1.4 cm without distal common duct calculus unchanged from prior. Colonic diverticulosis without findings of diverticulitis. Fibrotic changes in the lung bases.   - Chest X-ray: Patchy consolidation in the left base, new compared to prior. Possible pneumonia.    Interventions: She was given Mag sulfate 2g IV, Zofran 4mg IV, Azithromycin 500mg IV, Rocephin 1g IV, Kcl, and NS 500mL. Admitted to the floor for further observation.     Past Medical History: Hypothyroidism, HTN,  Delma syndrome, Vitamin D deficiency, osteoporosis, and mild tricuspid/mitral regurgitation  Past Surgical History: History of cataract surgery 1993, bilateral TKA 2016 & 2017  Allergies: None   Family History: Rheumatic heart disease, cerebral palsy, glioblastoma, malignant neoplasm   Home Medications: See med rec     Social History:  - Coming from home  - Currently lives with her    - Tobacco: Denies   - Alcohol: Denies   - Illicit Drug: Denies     Code Status: DNR/DNI discussed with patient      Past Medical History  She has a past medical history of Body mass index (BMI) 25.0-25.9, adult (02/10/2021), Body mass index (BMI) 25.0-25.9, adult (11/04/2021), Disorder of thyroid, unspecified (01/21/2021), Essential (primary) hypertension (07/18/2022), Hypokalemia (03/08/2022), Hypopituitarism (CMS/HCC) (04/28/2022), Hypothyroidism, unspecified (07/18/2022), Other disorders of pituitary gland (CMS/HCC) (11/04/2021), and Unspecified hearing loss, unspecified ear (01/24/2021).    Surgical History  She has a past surgical history that includes Other surgical history (01/24/2021) and Other surgical history (01/24/2021).     Social History  She reports that she has never smoked. She has never used smokeless tobacco. She reports current alcohol use. She reports that she does not use drugs.    Family History  Family History   Problem Relation Name Age of Onset    Other (Glioblastoma) Father      Cerebral palsy Sister      Other (Malignant Neoplasm) Brother      Other (Rheumatic Heart Disease) Brother          Allergies  Patient has no known allergies.    Review of Systems   Constitutional:  Positive for activity change (decrease activity) and appetite change (loss of appetite).        Physical Exam  Constitutional:       General: She is not in acute distress.  HENT:      Head: Normocephalic and atraumatic.   Cardiovascular:      Heart sounds: Normal heart sounds.   Pulmonary:      Effort: Pulmonary effort is normal.       Breath sounds: Normal breath sounds.   Abdominal:      General: Bowel sounds are normal.      Palpations: Abdomen is soft.   Musculoskeletal:         General: Normal range of motion.   Skin:     General: Skin is warm.   Neurological:      General: No focal deficit present.      Mental Status: She is alert and oriented to person, place, and time. Mental status is at baseline.   Psychiatric:         Mood and Affect: Mood normal.         Behavior: Behavior normal.         Thought Content: Thought content normal.         Judgment: Judgment normal.          Last Recorded Vitals  /77   Pulse 61   Temp 35.8 °C (96.4 °F) (Temporal)   Resp 18   Wt 54 kg (119 lb)   SpO2 98%     Relevant Results        Results for orders placed or performed during the hospital encounter of 11/02/23 (from the past 24 hour(s))   Sars-CoV-2 PCR, Symptomatic   Result Value Ref Range    Coronavirus 2019, PCR Not Detected Not Detected   CBC and Auto Differential   Result Value Ref Range    WBC 5.2 4.4 - 11.3 x10*3/uL    nRBC 0.0 0.0 - 0.0 /100 WBCs    RBC 5.19 4.00 - 5.20 x10*6/uL    Hemoglobin 14.5 12.0 - 16.0 g/dL    Hematocrit 42.1 36.0 - 46.0 %    MCV 81 80 - 100 fL    MCH 27.9 26.0 - 34.0 pg    MCHC 34.4 32.0 - 36.0 g/dL    RDW 13.2 11.5 - 14.5 %    Platelets 375 150 - 450 x10*3/uL    Neutrophils % 48.6 40.0 - 80.0 %    Immature Granulocytes %, Automated 0.2 0.0 - 0.9 %    Lymphocytes % 36.5 13.0 - 44.0 %    Monocytes % 8.4 2.0 - 10.0 %    Eosinophils % 5.5 0.0 - 6.0 %    Basophils % 0.8 0.0 - 2.0 %    Neutrophils Absolute 2.55 1.60 - 5.50 x10*3/uL    Immature Granulocytes Absolute, Automated 0.01 0.00 - 0.50 x10*3/uL    Lymphocytes Absolute 1.91 0.80 - 3.00 x10*3/uL    Monocytes Absolute 0.44 0.05 - 0.80 x10*3/uL    Eosinophils Absolute 0.29 0.00 - 0.40 x10*3/uL    Basophils Absolute 0.04 0.00 - 0.10 x10*3/uL   Comprehensive metabolic panel   Result Value Ref Range    Glucose 105 (H) 74 - 99 mg/dL    Sodium 126 (L) 136 - 145  mmol/L    Potassium 3.0 (L) 3.5 - 5.3 mmol/L    Chloride 91 (L) 98 - 107 mmol/L    Bicarbonate 24 21 - 32 mmol/L    Anion Gap 14 10 - 20 mmol/L    Urea Nitrogen 8 6 - 23 mg/dL    Creatinine 0.89 0.50 - 1.05 mg/dL    eGFR 65 >60 mL/min/1.73m*2    Calcium 9.6 8.6 - 10.3 mg/dL    Albumin 4.0 3.4 - 5.0 g/dL    Alkaline Phosphatase 58 33 - 136 U/L    Total Protein 7.5 6.4 - 8.2 g/dL    AST 44 (H) 9 - 39 U/L    Bilirubin, Total 1.0 0.0 - 1.2 mg/dL    ALT 14 7 - 45 U/L   Lipase   Result Value Ref Range    Lipase 30 9 - 82 U/L   Lactate   Result Value Ref Range    Lactate 2.2 (H) 0.4 - 2.0 mmol/L   Magnesium   Result Value Ref Range    Magnesium 1.41 (L) 1.60 - 2.40 mg/dL   Troponin I, High Sensitivity   Result Value Ref Range    Troponin I, High Sensitivity 5 0 - 13 ng/L   Creatine Kinase   Result Value Ref Range    Creatine Kinase 40 0 - 215 U/L   TSH with reflex to Free T4 if abnormal   Result Value Ref Range    Thyroid Stimulating Hormone 0.11 (L) 0.44 - 3.98 mIU/L   Lactate   Result Value Ref Range    Lactate 1.3 0.4 - 2.0 mmol/L     CT abdomen pelvis wo IV contrast    Result Date: 11/2/2023  STUDY: CT Abdomen and Pelvis without IV Contrast; 11/2/23 at 12:06 PM INDICATION: Pain. COMPARISON: CT CAP 9/17/23. ACCESSION NUMBER(S): [id number] ORDERING CLINICIAN: RAJESH BURRIS TECHNIQUE: CT of the abdomen and pelvis was performed.  Contiguous axial images were obtained at 3 mm slice thickness through the abdomen and pelvis. Coronal and sagittal reconstructions at 3 mm slice thickness were performed. No intravenous contrast was administered.   Automated mA/kV exposure control was utilized and patient examination was performed in strict accordance with principles of ALARA. FINDINGS: Please note that the evaluation of vessels, lymph nodes and organs is limited without intravenous contrast.  LOWER CHEST: No cardiomegaly.  No pericardial effusion.  Fibrotic changes noted in the lung bases.  ABDOMEN:  LIVER: No hepatomegaly.   Smooth surface contour.  Normal attenuation.  BILE DUCTS: Stable mild biliary dilatation measuring 1.4 cm without distal common duct calculus unchanged from prior  GALLBLADDER: The gallbladder is absent. STOMACH: No abnormalities identified.  PANCREAS: No masses or ductal dilatation.  SPLEEN: No splenomegaly or focal splenic lesion.  ADRENAL GLANDS: No thickening or nodules.  KIDNEYS AND URETERS: Kidneys are normal in size and location.  No renal or ureteral calculi.  PELVIS:  BLADDER: No abnormalities identified.  REPRODUCTIVE ORGANS: No abnormalities identified.  BOWEL: Colonic diverticulosis without findings of diverticulitis. Appendix is not visualized.  VESSELS: No abnormalities identified.  Abdominal aorta is normal in caliber. Moderate to severe plaque along the distal aorta.  PERITONEUM/RETROPERITONEUM/LYMPH NODES: No free fluid.  No pneumoperitoneum. No lymphadenopathy.  ABDOMINAL WALL: No abnormalities identified. SOFT TISSUES: No abnormalities identified.  BONES: No acute fracture or aggressive osseous lesion.    1. No acute process. 2. Stable mild biliary dilatation measuring 1.4 cm without distal common duct calculus unchanged from prior. Could consider further evaluation with MRCP if clinically indicated. 3. Colonic diverticulosis without findings of diverticulitis. 4. Fibrotic changes in the lung bases. Signed by Erik Aguillon MD    XR chest 1 view    Result Date: 11/2/2023  STUDY: Chest Radiograph;  11/2/2023 11:58 AM INDICATION: Weakness. COMPARISON: 9/17/2023 CT CAP; 6/25/20119 XR chest. ACCESSION NUMBER(S): [id number] ORDERING CLINICIAN: RAJESH BURRIS TECHNIQUE:  Frontal chest was obtained at 11:43 hours. FINDINGS: CARDIOMEDIASTINAL SILHOUETTE: Cardiomediastinal silhouette is normal in size and configuration.  LUNGS: Emphysematous changes noted. Patchy consolidation in the left base, new compared to prior  ABDOMEN: No remarkable upper abdominal findings.  BONES: No acute osseous  changes.    Patchy consolidation in the left base, new compared to prior. Correlate clinically for small area of pneumonia. Signed by Erik Aguillon MD   Scheduled medications  amLODIPine, 5 mg, oral, Daily  [START ON 11/3/2023] amoxicillin-pot clavulanate, 875 mg, oral, q12h ANGELO  heparin (porcine), 5,000 Units, subcutaneous, q8h ANGELO  hydrocortisone, 5 mg, oral, q12h  [START ON 11/3/2023] levothyroxine, 25 mcg, oral, Daily  mirtazapine, 7.5 mg, oral, Nightly  multivitamin with minerals, 1 tablet, oral, Daily  ondansetron, 4 mg, intravenous, Once  potassium chloride, 20 mEq, oral, BID  potassium chloride CR, 20 mEq, oral, Once      Continuous medications  lactated Ringer's, 100 mL/hr      PRN medications  PRN medications: albuterol       Assessment/Plan     Bridgette Moody is a 82 y.o. female with a past medical history of hypothyroidism, HTN, Delma syndrome, Vitamin D deficiency, osteoporosis, and mild tricuspid/mitral regurgitation who presented to [facility] ED on 11/2/23 of weakness, n/v, and diarrhea.     Acute Medical Issues:   # CAP   - Afebrile, no leukocytosis, did report dry cough  - CXR in ED showed edward consolidation left base   - S/p Azithromycin + Rocephin in ED   - Start Augmentin 875mg BID (11/3- ) as appears uncomplicated  - Blood cultures ordered   - Procal, Legionella/S.pneumo urine Ag ordered   - Duonebs q6h PRN     # Multiple Electrolyte Abnormalities (HypoNa, HypoK, HypoMg)  # Generalized Weakness   # Decrease appetite   - Na 126, K 3.0. Mg 1.41 on admission   - S/p 500 ml NS in ED, electrolytes repleted   - Continue LR @ 100cc/hr   - Start Mirtazapine 7.5mg nightly  - PT/OT consulted   - Social work and Nutrition consulted     # Diverticulosis   - Pt admits to diarrhea  - CT abd/pel in ED showed acute diverticulosis without diverticulitis   - Stool pathogen panel ordered     # Biliary Dilatation   - CT abd/pel in ED showed biliary dilatation measuring 1.4 cm without distal common duct calculus  unchanged from prior  - Can consider MRCP     Chronic Medical Issues:   # HTN: Continue Amlodipine 5mg daily   # Delma Syndrome: Continue Hydrocortisone 5mg BID   # Hypothyroidism: Continue Levothyroxine 25mcg daily     Fluids: PRN bolus   Electrolytes: replete as needed  Nutrition: Regular   GI PPx: None   DVT PPx: Heparin     Oxygenation: RA  Antibiotics: Augmentin     Dispo: Admitted to obs for weakness, electrolyte abnormalities, CAP. eLOS < 48 hrs.       Jah Wooten MD

## 2023-11-03 ENCOUNTER — PHARMACY VISIT (OUTPATIENT)
Dept: PHARMACY | Facility: CLINIC | Age: 83
End: 2023-11-03
Payer: COMMERCIAL

## 2023-11-03 VITALS
SYSTOLIC BLOOD PRESSURE: 112 MMHG | BODY MASS INDEX: 23.99 KG/M2 | OXYGEN SATURATION: 97 % | RESPIRATION RATE: 18 BRPM | WEIGHT: 119 LBS | DIASTOLIC BLOOD PRESSURE: 64 MMHG | HEART RATE: 72 BPM | TEMPERATURE: 97 F | HEIGHT: 59 IN

## 2023-11-03 PROBLEM — R79.89 LACTATE BLOOD INCREASE: Status: RESOLVED | Noted: 2023-11-02 | Resolved: 2023-11-03

## 2023-11-03 PROBLEM — R53.1 WEAKNESS: Status: ACTIVE | Noted: 2023-11-03

## 2023-11-03 LAB
ALBUMIN SERPL BCP-MCNC: 3.7 G/DL (ref 3.4–5)
ANION GAP SERPL CALC-SCNC: 13 MMOL/L (ref 10–20)
BUN SERPL-MCNC: 5 MG/DL (ref 6–23)
C COLI+JEJ+UPSA DNA STL QL NAA+PROBE: NOT DETECTED
CALCIUM SERPL-MCNC: 9 MG/DL (ref 8.6–10.3)
CHLORIDE SERPL-SCNC: 100 MMOL/L (ref 98–107)
CO2 SERPL-SCNC: 24 MMOL/L (ref 21–32)
CREAT SERPL-MCNC: 0.81 MG/DL (ref 0.5–1.05)
EC STX1 GENE STL QL NAA+PROBE: NOT DETECTED
EC STX2 GENE STL QL NAA+PROBE: NOT DETECTED
ERYTHROCYTE [DISTWIDTH] IN BLOOD BY AUTOMATED COUNT: 13.4 % (ref 11.5–14.5)
GFR SERPL CREATININE-BSD FRML MDRD: 73 ML/MIN/1.73M*2
GLUCOSE SERPL-MCNC: 90 MG/DL (ref 74–99)
HCT VFR BLD AUTO: 40.3 % (ref 36–46)
HGB BLD-MCNC: 13.5 G/DL (ref 12–16)
MAGNESIUM SERPL-MCNC: 1.66 MG/DL (ref 1.6–2.4)
MCH RBC QN AUTO: 28.1 PG (ref 26–34)
MCHC RBC AUTO-ENTMCNC: 33.5 G/DL (ref 32–36)
MCV RBC AUTO: 84 FL (ref 80–100)
NOROVIRUS GI + GII RNA STL NAA+PROBE: NOT DETECTED
NRBC BLD-RTO: 0 /100 WBCS (ref 0–0)
PHOSPHATE SERPL-MCNC: 2.8 MG/DL (ref 2.5–4.9)
PLATELET # BLD AUTO: 372 X10*3/UL (ref 150–450)
POTASSIUM SERPL-SCNC: 3.9 MMOL/L (ref 3.5–5.3)
PROCALCITONIN SERPL-MCNC: 0.04 NG/ML
RBC # BLD AUTO: 4.8 X10*6/UL (ref 4–5.2)
RV RNA STL NAA+PROBE: NOT DETECTED
SALMONELLA DNA STL QL NAA+PROBE: NOT DETECTED
SHIGELLA DNA SPEC QL NAA+PROBE: NOT DETECTED
SODIUM SERPL-SCNC: 133 MMOL/L (ref 136–145)
V CHOLERAE DNA STL QL NAA+PROBE: NOT DETECTED
WBC # BLD AUTO: 3.7 X10*3/UL (ref 4.4–11.3)
Y ENTEROCOL DNA STL QL NAA+PROBE: NOT DETECTED

## 2023-11-03 PROCEDURE — 87506 IADNA-DNA/RNA PROBE TQ 6-11: CPT | Mod: GEALAB

## 2023-11-03 PROCEDURE — 97165 OT EVAL LOW COMPLEX 30 MIN: CPT | Mod: GO

## 2023-11-03 PROCEDURE — G0378 HOSPITAL OBSERVATION PER HR: HCPCS

## 2023-11-03 PROCEDURE — 2500000004 HC RX 250 GENERAL PHARMACY W/ HCPCS (ALT 636 FOR OP/ED)

## 2023-11-03 PROCEDURE — 2500000001 HC RX 250 WO HCPCS SELF ADMINISTERED DRUGS (ALT 637 FOR MEDICARE OP)

## 2023-11-03 PROCEDURE — 83735 ASSAY OF MAGNESIUM: CPT

## 2023-11-03 PROCEDURE — 97535 SELF CARE MNGMENT TRAINING: CPT | Mod: GO

## 2023-11-03 PROCEDURE — 36415 COLL VENOUS BLD VENIPUNCTURE: CPT

## 2023-11-03 PROCEDURE — 97530 THERAPEUTIC ACTIVITIES: CPT | Mod: GO

## 2023-11-03 PROCEDURE — 96372 THER/PROPH/DIAG INJ SC/IM: CPT

## 2023-11-03 PROCEDURE — 99239 HOSP IP/OBS DSCHRG MGMT >30: CPT

## 2023-11-03 PROCEDURE — 80069 RENAL FUNCTION PANEL: CPT

## 2023-11-03 PROCEDURE — 97161 PT EVAL LOW COMPLEX 20 MIN: CPT | Mod: GP

## 2023-11-03 PROCEDURE — 85027 COMPLETE CBC AUTOMATED: CPT

## 2023-11-03 PROCEDURE — RXMED WILLOW AMBULATORY MEDICATION CHARGE

## 2023-11-03 RX ORDER — AMOXICILLIN AND CLAVULANATE POTASSIUM 875; 125 MG/1; MG/1
1 TABLET, FILM COATED ORAL 2 TIMES DAILY
Qty: 8 TABLET | Refills: 0 | Status: SHIPPED | OUTPATIENT
Start: 2023-11-03 | End: 2023-11-07

## 2023-11-03 RX ORDER — LANOLIN ALCOHOL/MO/W.PET/CERES
400 CREAM (GRAM) TOPICAL ONCE
Status: COMPLETED | OUTPATIENT
Start: 2023-11-03 | End: 2023-11-03

## 2023-11-03 RX ORDER — LANOLIN ALCOHOL/MO/W.PET/CERES
100 CREAM (GRAM) TOPICAL DAILY
Qty: 60 TABLET | Refills: 0 | Status: SHIPPED | OUTPATIENT
Start: 2023-11-04 | End: 2024-01-02

## 2023-11-03 RX ORDER — LOPERAMIDE HYDROCHLORIDE 2 MG/1
2 CAPSULE ORAL 4 TIMES DAILY PRN
Status: DISCONTINUED | OUTPATIENT
Start: 2023-11-03 | End: 2023-11-03 | Stop reason: HOSPADM

## 2023-11-03 RX ORDER — THIAMINE HYDROCHLORIDE 100 MG/ML
100 INJECTION, SOLUTION INTRAMUSCULAR; INTRAVENOUS DAILY
Status: DISCONTINUED | OUTPATIENT
Start: 2023-11-05 | End: 2023-11-03 | Stop reason: HOSPADM

## 2023-11-03 RX ORDER — LOPERAMIDE HYDROCHLORIDE 2 MG/1
2 CAPSULE ORAL 4 TIMES DAILY PRN
Qty: 30 CAPSULE | Refills: 0 | Status: SHIPPED | OUTPATIENT
Start: 2023-11-03 | End: 2023-11-13

## 2023-11-03 RX ORDER — MIRTAZAPINE 7.5 MG/1
7.5 TABLET, FILM COATED ORAL NIGHTLY
Qty: 30 TABLET | Refills: 0 | Status: SHIPPED | OUTPATIENT
Start: 2023-11-03 | End: 2024-01-10 | Stop reason: WASHOUT

## 2023-11-03 RX ADMIN — HEPARIN SODIUM 5000 UNITS: 5000 INJECTION INTRAVENOUS; SUBCUTANEOUS at 05:29

## 2023-11-03 RX ADMIN — LOPERAMIDE HYDROCHLORIDE 2 MG: 2 CAPSULE ORAL at 13:21

## 2023-11-03 RX ADMIN — HYDROCORTISONE 5 MG: 10 TABLET ORAL at 17:02

## 2023-11-03 RX ADMIN — SODIUM CHLORIDE, POTASSIUM CHLORIDE, SODIUM LACTATE AND CALCIUM CHLORIDE 100 ML/HR: 600; 310; 30; 20 INJECTION, SOLUTION INTRAVENOUS at 03:34

## 2023-11-03 RX ADMIN — AMOXICILLIN AND CLAVULANATE POTASSIUM 875 MG: 875; 125 TABLET, FILM COATED ORAL at 08:22

## 2023-11-03 RX ADMIN — HYDROCORTISONE 5 MG: 10 TABLET ORAL at 05:29

## 2023-11-03 RX ADMIN — MULTIPLE VITAMINS W/ MINERALS TAB 1 TABLET: TAB at 08:22

## 2023-11-03 RX ADMIN — AMLODIPINE BESYLATE 5 MG: 5 TABLET ORAL at 08:22

## 2023-11-03 RX ADMIN — LEVOTHYROXINE SODIUM 25 MCG: 25 TABLET ORAL at 05:29

## 2023-11-03 RX ADMIN — Medication 400 MG: at 07:55

## 2023-11-03 RX ADMIN — POTASSIUM CHLORIDE 20 MEQ: 1.5 POWDER, FOR SOLUTION ORAL at 08:22

## 2023-11-03 RX ADMIN — HEPARIN SODIUM 5000 UNITS: 5000 INJECTION INTRAVENOUS; SUBCUTANEOUS at 13:21

## 2023-11-03 ASSESSMENT — COGNITIVE AND FUNCTIONAL STATUS - GENERAL
WALKING IN HOSPITAL ROOM: A LITTLE
WALKING IN HOSPITAL ROOM: A LITTLE
DRESSING REGULAR LOWER BODY CLOTHING: A LITTLE
HELP NEEDED FOR BATHING: A LITTLE
HELP NEEDED FOR BATHING: A LITTLE
CLIMB 3 TO 5 STEPS WITH RAILING: A LITTLE
DRESSING REGULAR LOWER BODY CLOTHING: A LITTLE
DAILY ACTIVITIY SCORE: 23
DAILY ACTIVITIY SCORE: 21
MOBILITY SCORE: 22
TOILETING: A LITTLE
CLIMB 3 TO 5 STEPS WITH RAILING: A LITTLE
CLIMB 3 TO 5 STEPS WITH RAILING: A LITTLE
HELP NEEDED FOR BATHING: A LITTLE
WALKING IN HOSPITAL ROOM: A LITTLE
TOILETING: A LITTLE
MOBILITY SCORE: 22
DAILY ACTIVITIY SCORE: 21
MOBILITY SCORE: 22

## 2023-11-03 ASSESSMENT — PAIN SCALES - GENERAL
PAINLEVEL_OUTOF10: 0 - NO PAIN
PAINLEVEL_OUTOF10: 0 - NO PAIN

## 2023-11-03 ASSESSMENT — ACTIVITIES OF DAILY LIVING (ADL)
LACK_OF_TRANSPORTATION: NO
HOME_MANAGEMENT_TIME_ENTRY: 14
ADL_ASSISTANCE: INDEPENDENT

## 2023-11-03 ASSESSMENT — PAIN - FUNCTIONAL ASSESSMENT: PAIN_FUNCTIONAL_ASSESSMENT: 0-10

## 2023-11-03 NOTE — PROGRESS NOTES
Occupational Therapy    Evaluation    Patient Name: Bridgette Moody  MRN: [id number]  Today's Date: 11/3/2023  Time Calculation  Start Time: 1105  Stop Time: 1149  Time Calculation (min): 44 min    Assessment  IP OT Assessment  OT Assessment: 83 y/o female admitted for progressive weakness. She put forth good effort towards task completion and appears at functional baseline from OT standpoint. May benefit from some external supports to assist with IADL tasks and cooking.  End of Session Communication: Bedside nurse  End of Session Patient Position: Up in chair, Alarm on (Eating lunch)  Plan:  No Skilled OT: No acute OT goals identified  OT Discharge Recommendations: Low intensity level of continued care  OT - OK to Discharge: Yes    Subjective   Current Problem:  1. Lactate blood increase        2. Aspiration pneumonia of left lower lobe due to vomit (CMS/HCC)        3. Hyponatremia        4. Hypokalemia        5. Hypomagnesemia          General:  General  Reason for Referral: Impaired ADL  Referred By: COLIN Wooten  Past Medical History Relevant to Rehab: PMH: hypothyroid, HTN, Vitamin D deficiency, osteoporosis, B TKA, hypokalemia, gastroenteritis  Family/Caregiver Present: No  Prior to Session Communication: Bedside nurse  Patient Position Received: Up in chair, Alarm on  General Comment: Pt is pleasant and cooperative. Able to complete all tasks asked of her. Some impulsivity noted at times. MoCA assessment completed this date.  Precautions:  Medical Precautions: Fall precautions  Vital Signs:     Pain:  Pain Assessment  Pain Score: 0 - No pain    Objective   Cognition:  Overall Cognitive Status: Within Functional Limits  Orientation Level: Oriented X4  Impulsive: Mildly (Slightly impulsive at times and needed reminders to be mindful of IV line.)  Cognition Test Scores  Cognition Tests: Cognition Test Performed    Patient completed the [place] Cognitive Assessment (MOCA), version 8.1.  She scored 23/30 (>/=12yrs  education), indicating mild cognitive impairment.    Domain breakdown:  Visuospatial/Executive: 1/5  Naming: 3/3  Attention: 5/6  Language: 3/3  Abstraction: 2/2  Delayed Recall: 3/5 (MIS=12/15)  Orientation: 6/6    (Severity ranges for MOCA 26-30 normal, 18-25 Mild Cognitive Impairment, 10-17 Moderate Cognitive Impairment, 10 or less Severe Cognitive Impairment).         Home Living:  Type of Home: House  Lives With: Spouse  Home Adaptive Equipment: Walker rolling or standard, Cane  Home Layout: Multi-level, Stairs to alternate level with rails  Alternate Level Stairs-Number of Steps:  (17)  Home Access: Stairs to enter with rails  Entrance Stairs-Number of Steps: 3  Home Living Comments: Bed/bath on second floor   Prior Function:  Level of [place]: Independent with ADLs and functional transfers, Independent with homemaking with ambulation  Receives Help From:  (None. Pt provides care for her .)  ADL Assistance: Independent  Homemaking Assistance: Independent  Ambulatory Assistance: Independent  Prior Function Comments:  (Reports decreased overall strength and endurance approx 8 weeks.)  IADL History:  Homemaking Responsibilities: Yes  Current License: Yes  Occupation: Retired (teacher)  IADL Comments: Pt admits that IADL tasks, such as cooking, have been more difficult since she has not been feeling well. Gets no outside assist.  ADL:  ADL Comments: Pt demo's Indep with LB dressing and toileting. Toileted 2x during session and able to manage clothing, alban care, and hygiene.  Activity Tolerance:  Endurance: Endurance does not limit participation in activity  Bed Mobility/Transfers: Bed Mobility  Bed Mobility: Yes  Bed Mobility 1  Bed Mobility 1: Supine to sitting, Sitting to supine  Level of Assistance 1: Independent    Transfers  Transfer: Yes  Transfer 1  Transfer From 1: Sit to  Transfer to 1: Stand  Transfer Level of Assistance 1: Independent  Trials/Comments 1: Multiple transfers conpleted; bed,  toilet (2x), chair.      Ambulation/Gait Training:  Ambulation/Gait Training  Ambulation/Gait Training Performed: Yes  Ambulation/Gait Training 1  Comments/Distance (ft) 1: Pt ambulated through room bed<>bath Indep with assist for IV management  Sitting Balance:  Dynamic Sitting Balance  Dynamic Sitting-Comments: Good  Standing Balance:  Dynamic Standing Balance  Dynamic Standing-Comments: Good     IADL's:   Homemaking Responsibilities: Yes  Current License: Yes  Occupation: Retired (teacher)  IADL Comments: Pt admits that IADL tasks, such as cooking, have been more difficult since she has not been feeling well. Gets no outside assist.       Strength:  Strength Comments: BUE strength WFL      Outcome Measures: [facility] Daily Activity  Putting on and taking off regular lower body clothing: None  Bathing (including washing, rinsing, drying): A little  Putting on and taking off regular upper body clothing: None  Toileting, which includes using toilet, bedpan or urinal: None  Taking care of personal grooming such as brushing teeth: None  Eating Meals: None  Daily Activity - Total Score: 23

## 2023-11-03 NOTE — PROGRESS NOTES
Bridgette Moody is a 82 y.o. female on day 0 of admission presenting with Hyponatremia.    Subjective   Appears well this morning. Soft stools currently but not diarrhea. Does not feel safe going home - will work on discharge planning.     Objective     Last Recorded Vitals  /69 (Patient Position: Lying)   Pulse 73   Temp 36.2 °C (97.2 °F) (Temporal)   Resp 18   Wt 54 kg (119 lb)   SpO2 94%   Intake/Output last 3 Shifts:    Intake/Output Summary (Last 24 hours) at 11/3/2023 1058  Last data filed at 11/3/2023 0955  Gross per 24 hour   Intake 2278.33 ml   Output --   Net 2278.33 ml       Admission Weight  Weight: 51.3 kg (113 lb) (11/02/23 1136)    Daily Weight  11/02/23 : 54 kg (119 lb)    Image Results  CT abdomen pelvis wo IV contrast  Narrative: STUDY:  CT Abdomen and Pelvis without IV Contrast; 11/2/23 at 12:06 PM  INDICATION:  Pain.  COMPARISON:  CT CAP 9/17/23.  ACCESSION NUMBER(S):  [id number]  ORDERING CLINICIAN:  RAJESH BURRIS  TECHNIQUE:  CT of the abdomen and pelvis was performed.  Contiguous axial images  were obtained at 3 mm slice thickness through the abdomen and pelvis.   Coronal and sagittal reconstructions at 3 mm slice thickness were  performed. No intravenous contrast was administered.     Automated mA/kV exposure control was utilized and patient examination  was performed in strict accordance with principles of ALARA.  FINDINGS:  Please note that the evaluation of vessels, lymph nodes and organs is  limited without intravenous contrast.      LOWER CHEST:  No cardiomegaly.  No pericardial effusion.  Fibrotic changes noted in  the lung bases.     ABDOMEN:     LIVER:  No hepatomegaly.  Smooth surface contour.  Normal attenuation.     BILE DUCTS:  Stable mild biliary dilatation measuring 1.4 cm without distal common  duct calculus unchanged from prior     GALLBLADDER:  The gallbladder is absent.  STOMACH:  No abnormalities identified.     PANCREAS:  No masses or ductal dilatation.      SPLEEN:  No splenomegaly or focal splenic lesion.     ADRENAL GLANDS:  No thickening or nodules.     KIDNEYS AND URETERS:  Kidneys are normal in size and location.  No renal or ureteral  calculi.     PELVIS:     BLADDER:  No abnormalities identified.     REPRODUCTIVE ORGANS:  No abnormalities identified.     BOWEL:  Colonic diverticulosis without findings of diverticulitis. Appendix is  not visualized.     VESSELS:  No abnormalities identified.  Abdominal aorta is normal in caliber.  Moderate to severe plaque along the distal aorta.     PERITONEUM/RETROPERITONEUM/LYMPH NODES:  No free fluid.  No pneumoperitoneum.  No lymphadenopathy.     ABDOMINAL WALL:  No abnormalities identified.  SOFT TISSUES:   No abnormalities identified.     BONES:  No acute fracture or aggressive osseous lesion.  Impression: 1. No acute process.  2. Stable mild biliary dilatation measuring 1.4 cm without distal  common duct calculus unchanged from prior. Could consider further  evaluation with MRCP if clinically indicated.  3. Colonic diverticulosis without findings of diverticulitis.  4. Fibrotic changes in the lung bases.  Signed by Erik Aguillon MD  XR chest 1 view  Narrative: STUDY:  Chest Radiograph;  11/2/2023 11:58 AM  INDICATION:  Weakness.  COMPARISON:  9/17/2023 CT CAP; 6/25/20119 XR chest.  ACCESSION NUMBER(S):  [id number]  ORDERING CLINICIAN:  RAJESH BURRIS  TECHNIQUE:  Frontal chest was obtained at 11:43 hours.  FINDINGS:  CARDIOMEDIASTINAL SILHOUETTE:  Cardiomediastinal silhouette is normal in size and configuration.     LUNGS:  Emphysematous changes noted. Patchy consolidation in the left base,  new compared to prior     ABDOMEN:  No remarkable upper abdominal findings.     BONES:  No acute osseous changes.  Impression: Patchy consolidation in the left base, new compared to prior.  Correlate clinically for small area of pneumonia.  Signed by Erik Agulilon MD      Physical Exam  Constitutional:       Appearance: She is  normal weight.   Cardiovascular:      Rate and Rhythm: Normal rate and regular rhythm.      Heart sounds: Normal heart sounds. No murmur heard.     No gallop.   Pulmonary:      Breath sounds: Normal breath sounds.   Abdominal:      General: Abdomen is flat.      Palpations: Abdomen is soft.   Musculoskeletal:      Right lower leg: No edema.      Left lower leg: No edema.   Skin:     General: Skin is warm and dry.   Neurological:      Mental Status: She is alert. Mental status is at baseline.   Psychiatric:         Mood and Affect: Mood normal.         Behavior: Behavior normal.         Relevant Results  Scheduled medications  amLODIPine, 5 mg, oral, Daily  amoxicillin-pot clavulanate, 875 mg, oral, q12h ANGELO  heparin (porcine), 5,000 Units, subcutaneous, q8h ANGELO  hydrocortisone, 5 mg, oral, q12h  levothyroxine, 25 mcg, oral, Daily  mirtazapine, 7.5 mg, oral, Nightly  multivitamin with minerals, 1 tablet, oral, Daily  ondansetron, 4 mg, intravenous, Once  potassium chloride, 20 mEq, oral, BID      Continuous medications  lactated Ringer's, 100 mL/hr, Last Rate: 100 mL/hr (11/03/23 0334)      PRN medications  PRN medications: acetaminophen **OR** acetaminophen, albuterol, ondansetron    Results for orders placed or performed during the hospital encounter of 11/02/23 (from the past 24 hour(s))   Sars-CoV-2 PCR, Symptomatic   Result Value Ref Range    Coronavirus 2019, PCR Not Detected Not Detected   CBC and Auto Differential   Result Value Ref Range    WBC 5.2 4.4 - 11.3 x10*3/uL    nRBC 0.0 0.0 - 0.0 /100 WBCs    RBC 5.19 4.00 - 5.20 x10*6/uL    Hemoglobin 14.5 12.0 - 16.0 g/dL    Hematocrit 42.1 36.0 - 46.0 %    MCV 81 80 - 100 fL    MCH 27.9 26.0 - 34.0 pg    MCHC 34.4 32.0 - 36.0 g/dL    RDW 13.2 11.5 - 14.5 %    Platelets 375 150 - 450 x10*3/uL    Neutrophils % 48.6 40.0 - 80.0 %    Immature Granulocytes %, Automated 0.2 0.0 - 0.9 %    Lymphocytes % 36.5 13.0 - 44.0 %    Monocytes % 8.4 2.0 - 10.0 %    Eosinophils  % 5.5 0.0 - 6.0 %    Basophils % 0.8 0.0 - 2.0 %    Neutrophils Absolute 2.55 1.60 - 5.50 x10*3/uL    Immature Granulocytes Absolute, Automated 0.01 0.00 - 0.50 x10*3/uL    Lymphocytes Absolute 1.91 0.80 - 3.00 x10*3/uL    Monocytes Absolute 0.44 0.05 - 0.80 x10*3/uL    Eosinophils Absolute 0.29 0.00 - 0.40 x10*3/uL    Basophils Absolute 0.04 0.00 - 0.10 x10*3/uL   Comprehensive metabolic panel   Result Value Ref Range    Glucose 105 (H) 74 - 99 mg/dL    Sodium 126 (L) 136 - 145 mmol/L    Potassium 3.0 (L) 3.5 - 5.3 mmol/L    Chloride 91 (L) 98 - 107 mmol/L    Bicarbonate 24 21 - 32 mmol/L    Anion Gap 14 10 - 20 mmol/L    Urea Nitrogen 8 6 - 23 mg/dL    Creatinine 0.89 0.50 - 1.05 mg/dL    eGFR 65 >60 mL/min/1.73m*2    Calcium 9.6 8.6 - 10.3 mg/dL    Albumin 4.0 3.4 - 5.0 g/dL    Alkaline Phosphatase 58 33 - 136 U/L    Total Protein 7.5 6.4 - 8.2 g/dL    AST 44 (H) 9 - 39 U/L    Bilirubin, Total 1.0 0.0 - 1.2 mg/dL    ALT 14 7 - 45 U/L   Lipase   Result Value Ref Range    Lipase 30 9 - 82 U/L   Lactate   Result Value Ref Range    Lactate 2.2 (H) 0.4 - 2.0 mmol/L   Magnesium   Result Value Ref Range    Magnesium 1.41 (L) 1.60 - 2.40 mg/dL   Troponin I, High Sensitivity   Result Value Ref Range    Troponin I, High Sensitivity 5 0 - 13 ng/L   Creatine Kinase   Result Value Ref Range    Creatine Kinase 40 0 - 215 U/L   Blood Culture    Specimen: Peripheral Venipuncture; Blood culture   Result Value Ref Range    Blood Culture Loaded on Instrument - Culture in progress    Blood Culture    Specimen: Peripheral Venipuncture; Blood culture   Result Value Ref Range    Blood Culture Loaded on Instrument - Culture in progress    Lactate   Result Value Ref Range    Lactate 1.4 0.4 - 2.0 mmol/L   Procalcitonin   Result Value Ref Range    Procalcitonin 0.04 <=0.07 ng/mL   TSH with reflex to Free T4 if abnormal   Result Value Ref Range    Thyroid Stimulating Hormone 0.11 (L) 0.44 - 3.98 mIU/L   Thyroxine, Free   Result Value Ref  Range    Thyroxine, Free 0.90 0.61 - 1.12 ng/dL   Lactate   Result Value Ref Range    Lactate 1.3 0.4 - 2.0 mmol/L   Urinalysis with Reflex Microscopic   Result Value Ref Range    Color, Urine Straw Straw, Yellow    Appearance, Urine Clear Clear    Specific Gravity, Urine 1.005 1.005 - 1.035    pH, Urine 6.0 5.0, 5.5, 6.0, 6.5, 7.0, 7.5, 8.0    Protein, Urine NEGATIVE NEGATIVE mg/dL    Glucose, Urine NEGATIVE NEGATIVE mg/dL    Blood, Urine NEGATIVE NEGATIVE    Ketones, Urine 5 (TRACE) (A) NEGATIVE mg/dL    Bilirubin, Urine NEGATIVE NEGATIVE    Urobilinogen, Urine <2.0 <2.0 mg/dL    Nitrite, Urine NEGATIVE NEGATIVE    Leukocyte Esterase, Urine NEGATIVE NEGATIVE   CBC   Result Value Ref Range    WBC 3.7 (L) 4.4 - 11.3 x10*3/uL    nRBC 0.0 0.0 - 0.0 /100 WBCs    RBC 4.80 4.00 - 5.20 x10*6/uL    Hemoglobin 13.5 12.0 - 16.0 g/dL    Hematocrit 40.3 36.0 - 46.0 %    MCV 84 80 - 100 fL    MCH 28.1 26.0 - 34.0 pg    MCHC 33.5 32.0 - 36.0 g/dL    RDW 13.4 11.5 - 14.5 %    Platelets 372 150 - 450 x10*3/uL   Renal function panel   Result Value Ref Range    Glucose 90 74 - 99 mg/dL    Sodium 133 (L) 136 - 145 mmol/L    Potassium 3.9 3.5 - 5.3 mmol/L    Chloride 100 98 - 107 mmol/L    Bicarbonate 24 21 - 32 mmol/L    Anion Gap 13 10 - 20 mmol/L    Urea Nitrogen 5 (L) 6 - 23 mg/dL    Creatinine 0.81 0.50 - 1.05 mg/dL    eGFR 73 >60 mL/min/1.73m*2    Calcium 9.0 8.6 - 10.3 mg/dL    Phosphorus 2.8 2.5 - 4.9 mg/dL    Albumin 3.7 3.4 - 5.0 g/dL   Magnesium   Result Value Ref Range    Magnesium 1.66 1.60 - 2.40 mg/dL       Assessment/Plan      Bridgette Moody is a 82 y.o. female with a past medical history of hypothyroidism, HTN, Delma syndrome, Vitamin D deficiency, osteoporosis, and mild tricuspid/mitral regurgitation who presented to [facility] ED on 11/2/23 of weakness, n/v, and diarrhea.     Acute Medical Issues:   # CAP   - Afebrile, no leukocytosis, did report dry cough  - CXR in ED showed edward consolidation left base   - S/p  Azithromycin + Rocephin in ED   - stopping Augmentin 875mg BID (11/3)  - Procal - 0.04  - Blood cultures in process  - Procal, Legionella/S.pneumo urine Ag ordered   - Duonebs q6h PRN     # Multiple Electrolyte Abnormalities (HypoNa, HypoK, HypoMg) - improving  # Generalized Weakness   # Decrease appetite   - Na 133, K 3.9. Mg 1.66 on admission   - S/p 500 ml NS in ED, electrolytes repleted   - Continue LR @ 100cc/hr   - Start Mirtazapine 7.5mg nightly  - PT/OT consulted   - Social work and Nutrition consulted     # Diverticulosis   - Pt admits to diarrhea  - CT abd/pel in ED showed acute diverticulosis without diverticulitis   - Stool pathogen panel ordered     # Biliary Dilatation   - CT abd/pel in ED showed biliary dilatation measuring 1.4 cm without distal common duct calculus unchanged from prior  - Can consider MRCP     Chronic Medical Issues:   # HTN: Continue Amlodipine 5mg daily   # Delma Syndrome: Continue Hydrocortisone 5mg BID   # Hypothyroidism: Continue Levothyroxine 25mcg daily     Fluids: PRN bolus   Electrolytes: replete as needed  Nutrition: Regular   GI PPx: None   DVT PPx: Heparin   Oxygenation: RA  Antibiotics: Augmentin (11/3)    Dispo: Admitted to obs for weakness, electrolyte abnormalities. Stopping Augmentin. Medically stable. Pending discharge to facility - will discuss with .     Robbi Land, DO  PGY-I Internal Medicine

## 2023-11-03 NOTE — NURSING NOTE
0700  Crae assumed form night shift VSS patient resting in bed NAD.  BS report completed.  Awaiting dailymd Eval.  Plan to DC to snf  0857 Resting in bed NAD VSS PT in to eval    REASSESMENT COMPLETE NO NOTABLE CAHNGE  1211 STOOL SAMPLE SENT

## 2023-11-03 NOTE — DISCHARGE INSTRUCTIONS
Mrs. Moody,     You were admitted to the hospital for pneumonia and generalized weakness. You are now stable enough to be discharged home.    The following recommendations are made for you at time of hospital discharge:  - Please take your home medications as instructed prior to hospitalization.   - Please follow-up with your primary care provider within 5-7 days from time of discharge. Please call your PCP's office to schedule an appointment. Likely will need to bring photo ID and insurance card to your appointment.   - If you experience any worsening symptoms or any new acute concerns arise, please contact your primary care provider to discuss and possibly arrange an appointment. If you cannot get in touch with provider or severe symptoms are present, please return to nearest emergency room/urgent care for evaluation and treatment.    NEW Medications:   - Augmentin twice daily to complete 5 day course (FIRST DOSE on 11/3 PM)   - Thiamine 100mg once daily   - Mirtazapine 7.5mg once daily   - Imodium 2mg 4x daily as needed for diarrhea     Other Instructions:   - Please follow up with PCP within 1 week   - Please follow up with GI to discuss incidental finding of biliary dilation seen on CT abdomen/pelvis, may need possible MRCP     It was a pleasure participating in your care!     Sincerely,   Your team at [facility]

## 2023-11-03 NOTE — PROGRESS NOTES
Received referral from [facility] about son wanting to talk to SW about pt being safe to discharge home. SW, [facility] and pt son Clement met with pt at bedside to discuss concerns about returning home. Pt lives at home with her  who has macular degeneration along with other health issues. They have 13 steps in their home. Pt is driving her and  around- son has noticed dents on their car. Son lives 45 minutes away and is [place] of the [place] work. Discussed pt capacity and possibility of assisted living. Pt is agreeable to assisted living and states that her  will be agreeable too. SW provided son with senior placement service list. [facility] is following up about possibility of a MOCA and will provide list of assisting living facilities.    ADDED 1357 SW reviewed MOCA test- pt scored a 23/30. SW met with son Clement and explained that pt falls into the mild cognitive impairment range. SW discussed private duty care in the home and also respite care. Son does not want respite care. Son asked for private duty care list- SW provided. No further social work needs identified at this time.

## 2023-11-03 NOTE — PROGRESS NOTES
Physical Therapy    Physical Therapy Evaluation    Patient Name: Bridgette Moody  MRN: [id number]  Today's Date: 11/3/2023   Time Calculation  Start Time: 0858  Stop Time: 0928  Time Calculation (min): 30 min    Assessment/Plan   PT Assessment  PT Assessment Results: Decreased strength, Decreased endurance, Impaired balance, Decreased mobility  Rehab Prognosis: Good  Evaluation/Treatment Tolerance: Patient tolerated treatment well  Medical Staff Made Aware: Yes  End of Session Communication: Care Coordinator  Assessment Comment: pt demonstrates decreased balance, strength, gait stability/tolerance. Would benefit from use of FWW to improve gait stablity. Recommend PT follow up to address functional impairments.  End of Session Patient Position: Bed, 3 rail up, Alarm on  IP OR SWING BED PT PLAN  Inpatient or Swing Bed: Inpatient  PT Plan  Treatment/Interventions: Bed mobility, Transfer training, Gait training, Stair training, Balance training, Strengthening  PT Plan: Skilled PT  PT Frequency: 2 times per week  PT Discharge Recommendations: Low intensity level of continued care  Equipment Recommended upon Discharge: Wheeled walker  PT Recommended Transfer Status: Stand by assist  PT - OK to Discharge: Yes      Subjective   General Visit Information:  General  Reason for Referral: 82 YOF admitted with progressive weakness  Referred By: COLIN Wooten  Past Medical History Relevant to Rehab: PMH: hypothyroid, HTN, Vitamin D deficiency, osteoporosis, B TKA, hypokalemia, gastroenteritis  Family/Caregiver Present: Yes  Caregiver Feedback: son present at end of session; voiced concerns. PT provided education regarding DC rec, TCC notfified of possible SW for additional resources  Prior to Session Communication: Bedside nurse  Patient Position Received: Bed, 3 rail up  General Comment: pt supine in bed, cleared for session per RN. Pt agreeble to parficipate, pleasant throughout. Mild gait instability noted with progressive ambulation,  would benefit from use of FWW to improve gait stability/tolerance  Home Living:  Home Living  Type of Home: House  Lives With: Spouse  Home Adaptive Equipment: Walker rolling or standard, Cane  Home Layout: Multi-level, Stairs to alternate level with rails  Alternate Level Stairs-Rails:  (one)  Alternate Level Stairs-Number of Steps: 17  Home Access: Stairs to enter with rails  Entrance Stairs-Rails:  (one)  Entrance Stairs-Number of Steps: 3  Home Living Comments: bed/bath on second floor  Prior Level of Function:  Prior Function Per Pt/Caregiver Report  Level of [place]: Independent with ADLs and functional transfers, Independent with homemaking with ambulation  Prior Function Comments: reports decline x8 weeks  Precautions:  Precautions  Medical Precautions: Fall precautions  Vital Signs:       Objective   Pain:  Pain Assessment  Pain Assessment: 0-10  Pain Score: 0 - No pain  Cognition:  Cognition  Overall Cognitive Status:  (at least oriented to self and situation as assessed in conversation)    General Assessments:                          Strength  Strength Comments: B hips 4/5, B knees 4 to 4+/5, B ankles >=3/5                          Dynamic Standing Balance  Dynamic Standing-Balance Support: No upper extremity supported  Dynamic Standing-Balance: Reaching for objects, Reaching across midline  Dynamic Standing-Comments: alt R/L SB/S without LOB or symptoms of dizziness with positoin change  Functional Assessments:       Bed Mobility  Bed Mobility: Yes  Bed Mobility 1  Bed Mobility 1: Supine to sitting, Sitting to supine  Level of Assistance 1: Independent    Transfers  Transfer: Yes  Transfer 1  Transfer From 1: Sit to  Transfer to 1: Stand  Technique 1: Sit to stand, Stand to sit  Transfer Level of Assistance 1: Independent, Distant supervision  Trials/Comments 1: multiple transfers completed    Ambulation/Gait Training  Ambulation/Gait Training Performed: Yes  Ambulation/Gait Training 1  Surface 1:  Level tile  Device 1: No device  Assistance 1: Close supervision, Contact guard  Quality of Gait 1: Narrow base of support (intermittent scissoring)  Comments/Distance (ft) 1: 150 feet with direction change. Recommend use of FWW to improve gait safety         Outcome Measures:  [facility] Basic Mobility  Turning from your back to your side while in a flat bed without using bedrails: None  Moving from lying on your back to sitting on the side of a flat bed without using bedrails: None  Moving to and from bed to chair (including a wheelchair): None  Standing up from a chair using your arms (e.g. wheelchair or bedside chair): None  To walk in hospital room: A little  Climbing 3-5 steps with railing: A little  Basic Mobility - Total Score: 22    Encounter Problems       Encounter Problems (Active)       Balance       Goal 1       Start:  11/03/23    Expected End:  11/17/23       Pt will demo static/dynamic standing balance x5+ minutes with U to no UE support and S/I to improve stability and decrease falls              Mobility       gait       Start:  11/03/23    Expected End:  11/17/23       X250+ feet with/without use of DME and S/I assist  necessary to initiate return to PLOF           strength       Start:  11/03/23    Expected End:  11/17/23       X15+ reps ther ex to increase general strength and improve functional independence               Mobility       stairs        Start:  11/03/23    Expected End:  11/17/23       Pt will ascend/descent flight of stairs with rail necessary for homegoing                Education Documentation  Body Mechanics, taught by Gauri Wade, PT at 11/3/2023 10:14 AM.  Learner: Family, Patient  Readiness: Acceptance  Method: Explanation  Response: Verbalizes Understanding  Comment: DC rec, use of DME    Mobility Training, taught by Gauri Wade, PT at 11/3/2023 10:14 AM.  Learner: Family, Patient  Readiness: Acceptance  Method: Explanation  Response: Verbalizes  Understanding  Comment: DC rec, use of DME    Education Comments  No comments found.

## 2023-11-03 NOTE — PROGRESS NOTES
11/03/23 1308   Discharge Planning   Living Arrangements Spouse/significant other   Support Systems Spouse/significant other;Children   Assistance Needed Alert and oriented x2,   Type of Residence Private residence   Number of Stairs Within Residence 114   Do you have animals or pets at home? No   Who is requesting discharge planning? Provider   Home or Post Acute Services In home services;Other (Comment)  (Possible private pay for respite care, Assisted Living)   Type of Home Care Services Home PT;Home OT;Home nursing visits   Patient expects to be discharged to: Not sure at this time. Social work is following. Resources provided to the patient's son for assisted living facilities   Does the patient need discharge transport arranged? No   Financial Resource Strain   How hard is it for you to pay for the very basics like food, housing, medical care, and heating? Not hard   Housing Stability   In the last 12 months, was there a time when you were not able to pay the mortgage or rent on time? N   In the last 12 months, how many places have you lived? 1   In the last 12 months, was there a time when you did not have a steady place to sleep or slept in a shelter (including now)? N   Transportation Needs   In the past 12 months, has lack of transportation kept you from medical appointments or from getting medications? no   In the past 12 months, has lack of transportation kept you from meetings, work, or from getting things needed for daily living? No   Patient Choice   Provider Choice list and CMS website (https://medicare.gov/care-compare#search) for post-acute Quality and Resource Measure Data were provided and reviewed with: Family   Patient / Family choosing to utilize agency / facility established prior to hospitalization No

## 2023-11-03 NOTE — DISCHARGE SUMMARY
Discharge Diagnosis  CAP  Hyponatremia   Hypokalemia  Hypomagnesemia   Poor appetite  Generalized weakness        Issues Requiring Follow-Up    NEW Medications:   - Augmentin twice daily to complete 5 day course (FIRST DOSE on 11/3 PM)   - Thiamine 100mg once daily   - Mirtazapine 7.5mg once daily   - Imodium 2mg 4x daily as needed for diarrhea      Other Instructions:   - Please follow up with PCP within 1 week   - Please follow up with GI to discuss incidental finding of biliary dilation seen on CT abdomen/pelvis, may need possible MRCP      Discharge Meds     Your medication list        START taking these medications        Instructions Last Dose Given Next Dose Due   amoxicillin-pot clavulanate 875-125 mg tablet  Commonly known as: Augmentin      Take 1 tablet (875 mg) by mouth 2 times a day for 8 doses. First dose tonight 11/3       loperamide 2 mg capsule  Commonly known as: Imodium      Take 1 capsule (2 mg) by mouth 4 times a day as needed for diarrhea for up to 10 days.       mirtazapine 7.5 mg tablet  Commonly known as: Remeron      Take 1 tablet (7.5 mg) by mouth once daily at bedtime.       thiamine 100 mg tablet  Commonly known as: Vitamin B-1  Start taking on: November 4, 2023      Take 1 tablet (100 mg) by mouth once daily. Do not start before November 4, 2023.              CHANGE how you take these medications        Instructions Last Dose Given Next Dose Due   levocetirizine 5 mg tablet  Commonly known as: Xyzal  What changed:   when to take this  reasons to take this      Take 1 tablet (5 mg) by mouth once daily in the evening.              CONTINUE taking these medications        Instructions Last Dose Given Next Dose Due   amLODIPine 5 mg tablet  Commonly known as: Norvasc      Take 1 tablet (5 mg) by mouth once daily.       azelastine 137 mcg (0.1 %) nasal spray  Commonly known as: Astelin      Administer 1 spray into each nostril 2 times a day. Use in each nostril as directed        hydrocortisone 5 mg tablet  Commonly known as: Cortef      Take 1 tablet (5 mg) by mouth every 12 hours.       levothyroxine 25 mcg tablet  Commonly known as: Synthroid, Levoxyl      Take 1 tablet (25 mcg) by mouth once daily. ON AN EMPTY STOMACH       multivitamin tablet           potassium chloride CR 10 mEq ER tablet  Commonly known as: Klor-Con      Take 1 tablet (10 mEq) by mouth 2 times a day. Do not crush, chew, or split.              STOP taking these medications      benzonatate 100 mg capsule  Commonly known as: Tessalon        cefdinir 300 mg capsule  Commonly known as: Omnicef        cholestyramine 4 gram packet  Commonly known as: Questran        risedronate 35 mg tablet  Commonly known as: ActoneL                  Where to Get Your Medications        These medications were sent to [facility]  [address] [place] [address]      Hours: 9 AM to 5 PM Mon-Fri Phone: [phone]   amoxicillin-pot clavulanate 875-125 mg tablet  loperamide 2 mg capsule  mirtazapine 7.5 mg tablet  thiamine 100 mg tablet         Test Results Pending At Discharge  Pending Labs       Order Current Status    Respiratory Culture/Smear Collected (11/03/23 1223)    Stool Pathogen Panel, PCR In process    Blood Culture Preliminary result    Blood Culture Preliminary result            Hospital Course  Bridgette Moody is a 82 y.o. female with a past medical history of hypothyroidism, HTN, Delma syndrome, Vitamin D deficiency, osteoporosis, and mild tricuspid/mitral regurgitation who presented to [facility] ED on 11/2/23 with progressive weakness and worsening since her last admission to the hospital.  Patient has been having difficulty getting out of bed week also she been feeling sick complaining having some abdominal pain or nausea.  Another main concern is that she has been losing appetite progressively and she has lost about 10 pounds in the last 2 weeks she as she is unable to eat.  she did mention that she also  been dehydrated.  Patient also reported having a cough nonproductive in the past few days.  She does have also diarrhea which has been on and off. Of note, patient was recently discharged after admission for rhabdomyolysis    ED COURSE:   VS: T 97.2 F, HR 61, RR 17, /94, SpO2 100% on RA CBC: Unremarkable CMP: Na 126, K 3.0, Cl 91 M.41 Lactate: 2.2   -EKG showed sinus rhythm with first degree AV block CT abd pelvis wo con: Stable mild biliary dilatation measuring 1.4 cm without distal common duct calculus unchanged from prior. Colonic diverticulosis without findings of diverticulitis. Fibrotic changes in the lung bases.  Chest X-ray: Patchy consolidation in the left base, new compared to prior. Possible pneumonia.  ED Interventions: She was given Mag sulfate 2g IV, Zofran 4mg IV, Azithromycin 500mg IV, Rocephin 1g IV, Kcl, and NS 500mL. Admitted to the floor for further observation.      On the floor the patient was admitted for observation.  We transition antibiotics to p.o. Augmentin 875 mg twice daily given chest x-ray showed patchy consolidation of the left base but the patient is afebrile no leukocytosis on the lab only did report a dry cough.  Blood cultures were ordered along with Pro-Nathan, Legionella/strep pneumo urine antigens. For her electrolyte imbalance we repleted them accordingly.  For her generalized weakness and decreased appetite started the patient on mirtazapine 7.5 mg nightly and was placed on maintenance LR at 100 cc/h.  PT OT along with social work and nutrition were consulted.  Patient did report having diarrhea with CT abdomen did show acute diverticulosis without diverticulitis and stool pathogen panel was ordered.  C. difficile was not obtained given and the patient had a recent C. difficile test that was negative. CT abdomen pelvis also showed biliary dilatation measuring 1.4 cm without distal common duct calculus unchanged from prior and would consider MRCP outpatient with GI  consult/referral. She was started on thiamine supplements per recommendations of the dietitian. She was discharged home with [facility] on 11/3/23. New scripts at time of discharge: Augmentin BID x4 days (to complete 5 day course), Thiamine 100mg daily (d/t concern for re-feeding syndrome), Mirtazapine 7.5mg daily, and Imodium 2mg QID PRN. Instructed to follow up with PCP.     Pertinent Physical Exam At Time of Discharge  Constitutional:       Appearance: She is normal weight.   Cardiovascular:      Rate and Rhythm: Normal rate and regular rhythm.      Heart sounds: Normal heart sounds. No murmur heard.     No gallop.   Pulmonary:      Breath sounds: Normal breath sounds.   Abdominal:      General: Abdomen is flat.      Palpations: Abdomen is soft.   Musculoskeletal:      Right lower leg: No edema.      Left lower leg: No edema.   Skin:     General: Skin is warm and dry.   Neurological:      Mental Status: She is alert. Mental status is at baseline.   Psychiatric:         Mood and Affect: Mood normal.         Behavior: Behavior normal.     Outpatient Follow-Up  Future Appointments   Date Time Provider Department Center   1/10/2024  3:10 PM Vimal Mac MD [id number] [facility]   2/7/2024  1:20 PM Maciel Chi MD [id number] [facility]   4/25/2024 10:40 AM Vimal Mac MD [id number] [facility] Nobles DO

## 2023-11-04 ENCOUNTER — HOME HEALTH ADMISSION (OUTPATIENT)
Dept: HOME HEALTH SERVICES | Facility: HOME HEALTH | Age: 83
End: 2023-11-04
Payer: MEDICARE

## 2023-11-06 ENCOUNTER — PATIENT OUTREACH (OUTPATIENT)
Dept: PRIMARY CARE | Facility: CLINIC | Age: 83
End: 2023-11-06
Payer: MEDICARE

## 2023-11-06 DIAGNOSIS — J18.9 PNEUMONIA DUE TO INFECTIOUS ORGANISM, UNSPECIFIED LATERALITY, UNSPECIFIED PART OF LUNG: ICD-10-CM

## 2023-11-06 NOTE — PROGRESS NOTES
TCM Call completed- voicemail left    *Readmit in 30 days*  Patient is not scheduled within 7-14 days of discharge for hospital follow up.     If TCM has NOT been billed within 30 days and patient meets criteria for moderately complex , & has follow-up within 14 days-can bill [id number] for hospital follow up.   If TCM has not been billed within 30 days and patient meets criteria for highly complex & has follow-up visit within 7 days-can bill [id number] for hospital follow up.     Discharge Facility: [facility]  Discharge Diagnosis: Low sodium level and Pneumonia  Admission Date: 11/2/23  Discharge Date: 11/3/23    PCP Appointment Date: TBD  Specialist Appointment Date: GI- TBD  Hospital Encounter and Summary: Linked

## 2023-11-07 LAB
BACTERIA BLD CULT: NORMAL
BACTERIA BLD CULT: NORMAL

## 2023-11-07 NOTE — SIGNIFICANT EVENT
Follow Up Phone Call    Outgoing phone call    Spoke to: Bridgette Moody Relationship:self   Phone number: [phone]      Outcome: contacted patient/ family   Chief Complaint   Patient presents with    Nausea     N/V/D for few days           Diagnosis:diagnosis: PNEUMONIA Follow up Call:    How is your breathing? better   How is your activity? same/at baseline   How is your cough?  usual amount   Mucus: mucus: none   How often Are you using a Quick Relief/ Rescue Inhaler / Nebulizer:   N/A

## 2023-11-09 ENCOUNTER — APPOINTMENT (OUTPATIENT)
Dept: PRIMARY CARE | Facility: CLINIC | Age: 83
End: 2023-11-09
Payer: MEDICARE

## 2023-11-13 ENCOUNTER — HOME CARE VISIT (OUTPATIENT)
Dept: HOME HEALTH SERVICES | Facility: HOME HEALTH | Age: 83
End: 2023-11-13
Payer: MEDICARE

## 2023-11-13 VITALS
HEART RATE: 65 BPM | DIASTOLIC BLOOD PRESSURE: 67 MMHG | RESPIRATION RATE: 18 BRPM | SYSTOLIC BLOOD PRESSURE: 128 MMHG | TEMPERATURE: 98 F | OXYGEN SATURATION: 98 %

## 2023-11-13 PROCEDURE — 1090000002 HH PPS REVENUE DEBIT

## 2023-11-13 PROCEDURE — 169592 NO-PAY CLAIM PROCEDURE

## 2023-11-13 PROCEDURE — G0299 HHS/HOSPICE OF RN EA 15 MIN: HCPCS | Mod: HHH

## 2023-11-13 PROCEDURE — 0023 HH SOC

## 2023-11-13 PROCEDURE — 1090000001 HH PPS REVENUE CREDIT

## 2023-11-13 SDOH — ECONOMIC STABILITY: FOOD INSECURITY: INSUFFICIENT FUNDS FOR FOOD: 1

## 2023-11-13 ASSESSMENT — ENCOUNTER SYMPTOMS
APPETITE LEVEL: FAIR
OCCASIONAL FEELINGS OF UNSTEADINESS: 0
COUGH CHARACTERISTICS: NON-PRODUCTIVE
CHANGE IN APPETITE: UNCHANGED
COUGH: 1
DENIES PAIN: 1
LOWER EXTREMITY EDEMA: 1
LAST BOWEL MOVEMENT: 66791

## 2023-11-13 ASSESSMENT — ACTIVITIES OF DAILY LIVING (ADL)
OASIS_M1830: 03
AMBULATION ASSISTANCE: 1
ENTERING_EXITING_HOME: SUPERVISION
AMBULATION ASSISTANCE: SUPERVISION

## 2023-11-14 PROCEDURE — 1090000001 HH PPS REVENUE CREDIT

## 2023-11-14 PROCEDURE — 1090000002 HH PPS REVENUE DEBIT

## 2023-11-15 ENCOUNTER — HOME CARE VISIT (OUTPATIENT)
Dept: HOME HEALTH SERVICES | Facility: HOME HEALTH | Age: 83
End: 2023-11-15
Payer: MEDICARE

## 2023-11-15 PROCEDURE — G0151 HHCP-SERV OF PT,EA 15 MIN: HCPCS | Mod: HHH

## 2023-11-15 PROCEDURE — 1090000001 HH PPS REVENUE CREDIT

## 2023-11-15 PROCEDURE — 1090000002 HH PPS REVENUE DEBIT

## 2023-11-15 ASSESSMENT — ENCOUNTER SYMPTOMS
DENIES PAIN: 1
PERSON REPORTING PAIN: PATIENT

## 2023-11-15 NOTE — HOME HEALTH
"I have been sick since 9 11 23.  I was hospitalized.  Fainted upstairs in the bedroom.  I don't remember any of it.  I have done it before due to lack of potassium.    Then I got sick again to my stomach at the end of October.  Son took me down to [facility].  A lot of stomach pain.  It was very rare that it would be that severe.  I just now starting to get some of my strength back.  I am down to 110 pounds.  Baseline 123 pounds.    Independent within the house including all main areas of the house.  Patient plans to drive her  to and from his medical appointment today.  I offered the possibility of offering her visits weekly but she indicated that she did not think that it was necessary since she is already feeling better.  She indicated that \"last week I was doing worse, I could have used you then.\"  I have not had my hair done in the past 6 months."

## 2023-11-16 ENCOUNTER — HOME CARE VISIT (OUTPATIENT)
Dept: HOME HEALTH SERVICES | Facility: HOME HEALTH | Age: 83
End: 2023-11-16
Payer: MEDICARE

## 2023-11-16 VITALS
RESPIRATION RATE: 18 BRPM | TEMPERATURE: 97.2 F | DIASTOLIC BLOOD PRESSURE: 68 MMHG | OXYGEN SATURATION: 99 % | SYSTOLIC BLOOD PRESSURE: 116 MMHG | HEART RATE: 72 BPM

## 2023-11-16 PROCEDURE — 1090000002 HH PPS REVENUE DEBIT

## 2023-11-16 PROCEDURE — G0152 HHCP-SERV OF OT,EA 15 MIN: HCPCS | Mod: HHH

## 2023-11-16 PROCEDURE — 1090000001 HH PPS REVENUE CREDIT

## 2023-11-16 ASSESSMENT — ACTIVITIES OF DAILY LIVING (ADL)
BATHING_CURRENT_FUNCTION: INDEPENDENT
TOILETING: INDEPENDENT
TOILETING: 1
BATHING ASSESSED: 1
DRESSING_LB_CURRENT_FUNCTION: INDEPENDENT

## 2023-11-16 ASSESSMENT — ENCOUNTER SYMPTOMS
SUBJECTIVE PAIN PROGRESSION: UNCHANGED
DENIES PAIN: 1
PAIN SEVERITY GOAL: 0/10
HIGHEST PAIN SEVERITY IN PAST 24 HOURS: 0/10
LOWEST PAIN SEVERITY IN PAST 24 HOURS: 0/10

## 2023-11-16 ASSESSMENT — PAIN SCALES - PAIN ASSESSMENT IN ADVANCED DEMENTIA (PAINAD)
FACIALEXPRESSION: 0
CONSOLABILITY: 0 - NO NEED TO CONSOLE.
BODYLANGUAGE: 0
TOTALSCORE: 0
BODYLANGUAGE: 0 - RELAXED.
BREATHING: 0
NEGVOCALIZATION: 0 - NONE.
CONSOLABILITY: 0
FACIALEXPRESSION: 0 - SMILING OR INEXPRESSIVE.
NEGVOCALIZATION: 0

## 2023-11-17 PROCEDURE — 1090000002 HH PPS REVENUE DEBIT

## 2023-11-17 PROCEDURE — 1090000001 HH PPS REVENUE CREDIT

## 2023-11-18 PROCEDURE — 1090000002 HH PPS REVENUE DEBIT

## 2023-11-18 PROCEDURE — 1090000001 HH PPS REVENUE CREDIT

## 2023-11-19 PROCEDURE — 1090000001 HH PPS REVENUE CREDIT

## 2023-11-19 PROCEDURE — 1090000002 HH PPS REVENUE DEBIT

## 2023-11-20 ENCOUNTER — HOME CARE VISIT (OUTPATIENT)
Dept: HOME HEALTH SERVICES | Facility: HOME HEALTH | Age: 83
End: 2023-11-20
Payer: MEDICARE

## 2023-11-20 VITALS
SYSTOLIC BLOOD PRESSURE: 121 MMHG | BODY MASS INDEX: 22.82 KG/M2 | HEART RATE: 65 BPM | DIASTOLIC BLOOD PRESSURE: 63 MMHG | RESPIRATION RATE: 20 BRPM | WEIGHT: 113 LBS | TEMPERATURE: 98.1 F | OXYGEN SATURATION: 98 %

## 2023-11-20 PROCEDURE — 1090000002 HH PPS REVENUE DEBIT

## 2023-11-20 PROCEDURE — G0299 HHS/HOSPICE OF RN EA 15 MIN: HCPCS | Mod: HHH

## 2023-11-20 PROCEDURE — 1090000001 HH PPS REVENUE CREDIT

## 2023-11-20 ASSESSMENT — ACTIVITIES OF DAILY LIVING (ADL)
AMBULATION ASSISTANCE: 1
OASIS_M1830: 00
AMBULATION ASSISTANCE: INDEPENDENT
HOME_HEALTH_OASIS: 00

## 2023-11-20 ASSESSMENT — ENCOUNTER SYMPTOMS
PERSON REPORTING PAIN: PATIENT
DENIES PAIN: 1

## 2023-11-21 ENCOUNTER — PATIENT OUTREACH (OUTPATIENT)
Dept: PRIMARY CARE | Facility: CLINIC | Age: 83
End: 2023-11-21

## 2023-11-21 DIAGNOSIS — J18.9 PNEUMONIA DUE TO INFECTIOUS ORGANISM, UNSPECIFIED LATERALITY, UNSPECIFIED PART OF LUNG: ICD-10-CM

## 2023-11-21 NOTE — PROGRESS NOTES
Called patient to assess for any further needs they may have and to obtain an update on the patients status 14 days post discharge from the hospital. Patient did not follow up with their PCP within that time. Patient states they have no questions or concerns and are still recovering well post hospitalization. Patient denied any new or worsening symptoms to report. Patient states her appetite is not the best, but she was just started on a medication to help improve it. I educated on eating protein, staying hydrated and reporting any changes in status promptly to prevent another hospitalization. Patient was encouraged to call TCM with any needs or questions that may arise and to follow up with their PCP with any changes in status. TCM to follow up 30 days post hospitalization to reassess needs.

## 2023-11-30 PROCEDURE — G0180 MD CERTIFICATION HHA PATIENT: HCPCS | Performed by: INTERNAL MEDICINE

## 2023-12-14 ENCOUNTER — PATIENT OUTREACH (OUTPATIENT)
Dept: PRIMARY CARE | Facility: CLINIC | Age: 83
End: 2023-12-14
Payer: MEDICARE

## 2023-12-14 DIAGNOSIS — J18.9 PNEUMONIA DUE TO INFECTIOUS ORGANISM, UNSPECIFIED LATERALITY, UNSPECIFIED PART OF LUNG: ICD-10-CM

## 2023-12-14 NOTE — PROGRESS NOTES
Call placed regarding one month post discharge follow up call.  At time of outreach call the patient feels as if their condition has improved since initial visit with PCP or specialist.  Questions or concerns regarding recovery period addressed at this time.   Reviewed any PCP or specialists progress notes/labs/radiology reports if applicable and addressed any questions or concerns. Patient states she is doing much better lately; her appetite has improved and she has gained a few pounds. Patient denied any acute changes or concerns. Patient denied any questions or further needs from TCM. Patient declined further TCM outreach calls. Patient has met target of no readmission for 30 days post hospital discharge and is graduated from Transitional Care Management program at this time.

## 2023-12-22 DIAGNOSIS — E03.9 HYPOTHYROIDISM, UNSPECIFIED TYPE: ICD-10-CM

## 2023-12-23 RX ORDER — LEVOTHYROXINE SODIUM 25 UG/1
25 TABLET ORAL DAILY
Qty: 90 TABLET | Refills: 0 | Status: SHIPPED | OUTPATIENT
Start: 2023-12-23 | End: 2024-05-15 | Stop reason: SDUPTHER

## 2024-01-10 ENCOUNTER — OFFICE VISIT (OUTPATIENT)
Dept: PRIMARY CARE | Facility: CLINIC | Age: 84
End: 2024-01-10
Payer: MEDICARE

## 2024-01-10 VITALS
OXYGEN SATURATION: 97 % | SYSTOLIC BLOOD PRESSURE: 147 MMHG | HEIGHT: 59 IN | HEART RATE: 51 BPM | BODY MASS INDEX: 23.99 KG/M2 | DIASTOLIC BLOOD PRESSURE: 72 MMHG | WEIGHT: 119 LBS

## 2024-01-10 DIAGNOSIS — J18.9 PNEUMONIA OF LEFT LOWER LOBE DUE TO INFECTIOUS ORGANISM: ICD-10-CM

## 2024-01-10 DIAGNOSIS — E23.0 HYPOPITUITARISM (MULTI): Primary | ICD-10-CM

## 2024-01-10 DIAGNOSIS — M81.0 AGE-RELATED OSTEOPOROSIS WITHOUT CURRENT PATHOLOGICAL FRACTURE: ICD-10-CM

## 2024-01-10 DIAGNOSIS — E03.9 ACQUIRED HYPOTHYROIDISM: ICD-10-CM

## 2024-01-10 DIAGNOSIS — I10 BENIGN ESSENTIAL HYPERTENSION: ICD-10-CM

## 2024-01-10 DIAGNOSIS — E23.0 ACTH DEFICIENCY (MULTI): ICD-10-CM

## 2024-01-10 DIAGNOSIS — E87.6 HYPOKALEMIA: ICD-10-CM

## 2024-01-10 PROCEDURE — 1159F MED LIST DOCD IN RCRD: CPT | Performed by: INTERNAL MEDICINE

## 2024-01-10 PROCEDURE — 1036F TOBACCO NON-USER: CPT | Performed by: INTERNAL MEDICINE

## 2024-01-10 PROCEDURE — 3078F DIAST BP <80 MM HG: CPT | Performed by: INTERNAL MEDICINE

## 2024-01-10 PROCEDURE — 99214 OFFICE O/P EST MOD 30 MIN: CPT | Performed by: INTERNAL MEDICINE

## 2024-01-10 PROCEDURE — 3077F SYST BP >= 140 MM HG: CPT | Performed by: INTERNAL MEDICINE

## 2024-01-10 PROCEDURE — 1126F AMNT PAIN NOTED NONE PRSNT: CPT | Performed by: INTERNAL MEDICINE

## 2024-01-10 ASSESSMENT — COLUMBIA-SUICIDE SEVERITY RATING SCALE - C-SSRS
6. HAVE YOU EVER DONE ANYTHING, STARTED TO DO ANYTHING, OR PREPARED TO DO ANYTHING TO END YOUR LIFE?: NO
1. IN THE PAST MONTH, HAVE YOU WISHED YOU WERE DEAD OR WISHED YOU COULD GO TO SLEEP AND NOT WAKE UP?: NO
2. HAVE YOU ACTUALLY HAD ANY THOUGHTS OF KILLING YOURSELF?: NO

## 2024-01-10 ASSESSMENT — ENCOUNTER SYMPTOMS
LOSS OF SENSATION IN FEET: 0
OCCASIONAL FEELINGS OF UNSTEADINESS: 0
DEPRESSION: 0

## 2024-01-10 NOTE — PROGRESS NOTES
"Subjective   Patient ID: Bridgette Moody is a 83 y.o. female who presents for Follow-up (3mo).    HPI     Review of Systems    Objective   /72   Pulse 51   Ht 1.499 m (4' 11\")   Wt 54 kg (119 lb)   SpO2 97%   BMI 24.04 kg/m²     Physical Exam    Assessment/Plan          "

## 2024-01-10 NOTE — PROGRESS NOTES
"Subjective   Patient ID: Bridgette Moody is a 83 y.o. female who presents for Follow-up (3mo).    HPI patient presents to clinic after her discharge from [facility].  She was treated there for pneumonitis, hyponatremia, hypokalemia, hypomagnesemia and generalized weakness and poor appetite.  During hospitalization she had CT of the abdomen pelvis which revealed diverticulosis without any diverticulitis and showed some stable mild biliary dilatation measuring 1.4 cm without distal common duct calculus unchanged from prior studies without any fluid collection or mass lesion or any acute process and chest x-ray done which revealed patchy consolidation in the left lower base or possible pneumonitis.  Please see discharge summary for detailed information.  She seems to be doing fairly well and denies any cough, congestion, shortness of breath weakness and palpitation.  She is also thinking of moving closer to her son's house.  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         Review of Systems   Constitutional: Negative.    HENT: Negative.     Eyes: Negative.    Respiratory: Negative.     Cardiovascular: Negative.    Gastrointestinal: Negative.    Endocrine: Negative.    Genitourinary: Negative.    Musculoskeletal: Negative.    Skin: Negative.    Allergic/Immunologic: Negative.    Neurological: Negative.    Hematological: Negative.    Psychiatric/Behavioral: Negative.         Objective   /72   Pulse 51   Ht 1.499 m (4' 11\")   Wt 54 kg (119 lb)   SpO2 97%   BMI 24.04 kg/m²     Physical Exam  Constitutional:       Appearance: Normal appearance. She is normal weight.   HENT:      Right Ear: Tympanic membrane normal.      Left Ear: Tympanic membrane and ear canal normal.      Nose: Nose normal.   Neck:      Vascular: No carotid bruit. "   Cardiovascular:      Rate and Rhythm: Normal rate.   Pulmonary:      Effort: No respiratory distress.      Breath sounds: No stridor. No wheezing.   Abdominal:      Palpations: Abdomen is soft.      Tenderness: There is no guarding or rebound.   Skin:     Coloration: Skin is not jaundiced.   Neurological:      General: No focal deficit present.      Mental Status: She is alert and oriented to person, place, and time.   Psychiatric:         Mood and Affect: Mood normal.         Assessment/Plan    patient is given reassurance.  She will continue current medication.  She will also continue to follow-up with endocrinology clinic regarding history of hypopituitarism.  She will return to clinic in 3 months for follow-up visit.

## 2024-01-12 ASSESSMENT — ENCOUNTER SYMPTOMS
EYES NEGATIVE: 1
ENDOCRINE NEGATIVE: 1
NEUROLOGICAL NEGATIVE: 1
HEMATOLOGIC/LYMPHATIC NEGATIVE: 1
GASTROINTESTINAL NEGATIVE: 1
MUSCULOSKELETAL NEGATIVE: 1
PSYCHIATRIC NEGATIVE: 1
CONSTITUTIONAL NEGATIVE: 1
RESPIRATORY NEGATIVE: 1
ALLERGIC/IMMUNOLOGIC NEGATIVE: 1
CARDIOVASCULAR NEGATIVE: 1

## 2024-02-07 ENCOUNTER — OFFICE VISIT (OUTPATIENT)
Dept: ENDOCRINOLOGY | Facility: CLINIC | Age: 84
End: 2024-02-07
Payer: MEDICARE

## 2024-02-07 ENCOUNTER — LAB (OUTPATIENT)
Dept: LAB | Facility: LAB | Age: 84
End: 2024-02-07
Payer: MEDICARE

## 2024-02-07 VITALS
HEART RATE: 60 BPM | BODY MASS INDEX: 24.64 KG/M2 | WEIGHT: 122 LBS | DIASTOLIC BLOOD PRESSURE: 71 MMHG | SYSTOLIC BLOOD PRESSURE: 128 MMHG

## 2024-02-07 DIAGNOSIS — E23.0 ACTH DEFICIENCY (MULTI): ICD-10-CM

## 2024-02-07 DIAGNOSIS — E23.0 HYPOPITUITARISM (MULTI): Primary | ICD-10-CM

## 2024-02-07 DIAGNOSIS — E23.0 HYPOPITUITARISM (MULTI): ICD-10-CM

## 2024-02-07 LAB — T4 FREE SERPL-MCNC: 0.62 NG/DL (ref 0.61–1.12)

## 2024-02-07 PROCEDURE — 84439 ASSAY OF FREE THYROXINE: CPT

## 2024-02-07 PROCEDURE — 1159F MED LIST DOCD IN RCRD: CPT | Performed by: INTERNAL MEDICINE

## 2024-02-07 PROCEDURE — 1160F RVW MEDS BY RX/DR IN RCRD: CPT | Performed by: INTERNAL MEDICINE

## 2024-02-07 PROCEDURE — 99214 OFFICE O/P EST MOD 30 MIN: CPT | Performed by: INTERNAL MEDICINE

## 2024-02-07 PROCEDURE — 36415 COLL VENOUS BLD VENIPUNCTURE: CPT

## 2024-02-07 PROCEDURE — 3074F SYST BP LT 130 MM HG: CPT | Performed by: INTERNAL MEDICINE

## 2024-02-07 PROCEDURE — 1036F TOBACCO NON-USER: CPT | Performed by: INTERNAL MEDICINE

## 2024-02-07 PROCEDURE — 3078F DIAST BP <80 MM HG: CPT | Performed by: INTERNAL MEDICINE

## 2024-02-07 PROCEDURE — 1126F AMNT PAIN NOTED NONE PRSNT: CPT | Performed by: INTERNAL MEDICINE

## 2024-02-07 RX ORDER — HYDROCORTISONE 5 MG/1
5 TABLET ORAL EVERY 12 HOURS
Qty: 180 TABLET | Refills: 1
Start: 2024-02-07 | End: 2024-02-07 | Stop reason: SDUPTHER

## 2024-02-07 RX ORDER — HYDROCORTISONE 5 MG/1
5 TABLET ORAL EVERY 12 HOURS
Qty: 180 TABLET | Refills: 1
Start: 2024-02-07 | End: 2024-02-15 | Stop reason: SDUPTHER

## 2024-02-07 ASSESSMENT — ENCOUNTER SYMPTOMS
UNEXPECTED WEIGHT CHANGE: 0
SHORTNESS OF BREATH: 0
DIARRHEA: 0
VOMITING: 0
NAUSEA: 0
ENDOCRINE COMMENTS: AS ABOVE

## 2024-02-07 NOTE — PATIENT INSTRUCTIONS
RECOMMENDATIONS  Draw Free T4  Results available on [employer]  Call/message if you have not received results in 3 days.     Take levothyroxine on an empty stomach with water alone, 1 hour before eating or taking other medications, 4 hours before any calcium or iron supplement.     Continue hydrocortisone 5 mg by mouth twice daily     IF YOU ARE ILL, take double your hydrocortisone, 10 mg twice daily     Follow up 6 months  Repeat BMP, free T4 before next appointment.

## 2024-02-07 NOTE — PROGRESS NOTES
History Of Present Illness  Bridgette Moody is a 83 y.o. 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  She confirmed she actually does take it twice daily    Levothyroxine decreased from 50 to 25 mcg/day.  Last Rx from Dr. Mac.  She does not know when it was decreased.   Her personal med list still says 50 mcg.  Patient is taking levothyroxine on an empty stomach with amlodipine.    Past Medical History  She has a past medical history of Body mass index (BMI) 25.0-25.9, adult (02/10/2021), Body mass index (BMI) 25.0-25.9, adult (11/04/2021), Disorder of thyroid, unspecified (01/21/2021), Essential (primary) hypertension (07/18/2022), Hypokalemia (03/08/2022), Hypopituitarism (CMS/[facility]) (04/28/2022), Hypothyroidism, unspecified (07/18/2022), Other disorders of pituitary gland (CMS/[facility]) (11/04/2021), and Unspecified hearing loss, unspecified ear (01/24/2021).    Surgical History  She has a past surgical history that includes Other surgical history (01/24/2021) and Other surgical history (01/24/2021).     Social History  She reports that she has never smoked. She has never used smokeless tobacco. She reports current alcohol use. She reports that she does not use drugs.    Family History  Family History   Problem Relation Name Age of Onset    Other (Glioblastoma) Father      Cerebral palsy Sister      Other (Malignant Neoplasm) Brother      Other (Rheumatic Heart Disease) Brother         Medications  Current Outpatient Medications   Medication Instructions    amLODIPine (NORVASC) 5 mg, oral, Daily    azelastine (Astelin) 137 mcg (0.1 %) nasal spray 1 spray, Each Nostril, 2 times daily, Use in each nostril as directed    levothyroxine (SYNTHROID, LEVOXYL) 25 mcg, oral, Daily, take on an empty stomach    multivitamin tablet 1 tablet, oral, Daily    potassium chloride CR 10 mEq ER tablet 10 mEq, oral, 2 times  daily, Do not crush, chew, or split.       Allergies  Patient has no known allergies.    Review of Systems   Constitutional:  Negative for unexpected weight change.   HENT:  Positive for hearing loss.    Eyes:  Negative for visual disturbance.   Respiratory:  Negative for shortness of breath.    Gastrointestinal:  Negative for diarrhea, nausea and vomiting.   Endocrine:        As above         Last Recorded Vitals  Blood pressure 128/71, pulse 60, weight 55.3 kg (122 lb).    Physical Exam     Relevant Results  Lab Results   Component Value Date    TSH 0.11 (L) 11/02/2023    FREET4 0.90 11/02/2023      Latest Reference Range & Units 11/03/23 06:03   GLUCOSE 74 - 99 mg/dL 90   SODIUM 136 - 145 mmol/L 133 (L)   POTASSIUM 3.5 - 5.3 mmol/L 3.9   CHLORIDE 98 - 107 mmol/L 100   Bicarbonate 21 - 32 mmol/L 24   Anion Gap 10 - 20 mmol/L 13   Blood Urea Nitrogen 6 - 23 mg/dL 5 (L)   Creatinine 0.50 - 1.05 mg/dL 0.81   EGFR >60 mL/min/1.73m*2 73   Calcium 8.6 - 10.3 mg/dL 9.0   PHOSPHORUS 2.5 - 4.9 mg/dL 2.8   Albumin 3.4 - 5.0 g/dL 3.7       IMPRESSION  HYPOPITUITARISM, PARTIAL  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   She does not mount a TSH response due to pituitary insufficiency.  Low TSH does not indicate thyrotoxicosis in this case.      RECOMMENDATIONS  Draw Free T4  Results available on [employer]  Call/message if you have not received results in 3 days.     Take levothyroxine on an empty stomach with water alone, 1 hour before eating or taking other medications, 4 hours before any calcium or iron supplement.     Continue hydrocortisone 5 mg by mouth twice daily     IF YOU ARE ILL, take double your hydrocortisone, 10 mg twice daily     Follow up 6 months  Repeat BMP, free T4 before next appointment.

## 2024-02-07 NOTE — LETTER
February 7, 2024     Vimal Mac MD  [address]   [facility]  [place] [address]    Patient: Bridgette Moody   YOB: 1940   Date of Visit: 2/7/2024       Dear Dr. Vimal Mac MD:    Thank you for referring Bridgette Moody to me for evaluation. Below are my notes for this consultation.  If you have questions, please do not hesitate to call me. I look forward to following your patient along with you.       Sincerely,     Maciel Chi MD      CC: No Recipients  ______________________________________________________________________________________    History Of Present Illness  Bridgette Moody is a 83 y.o. 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  She confirmed she actually does take it twice daily    Levothyroxine decreased from 50 to 25 mcg/day.  Last Rx from Dr. Mac.  She does not know when it was decreased.   Her personal med list still says 50 mcg.  Patient is taking levothyroxine on an empty stomach with amlodipine.    Past Medical History  She has a past medical history of Body mass index (BMI) 25.0-25.9, adult (02/10/2021), Body mass index (BMI) 25.0-25.9, adult (11/04/2021), Disorder of thyroid, unspecified (01/21/2021), Essential (primary) hypertension (07/18/2022), Hypokalemia (03/08/2022), Hypopituitarism (CMS/HCC) (04/28/2022), Hypothyroidism, unspecified (07/18/2022), Other disorders of pituitary gland (CMS/HCC) (11/04/2021), and Unspecified hearing loss, unspecified ear (01/24/2021).    Surgical History  She has a past surgical history that includes Other surgical history (01/24/2021) and Other surgical history (01/24/2021).     Social History  She reports that she has never smoked. She has never used smokeless tobacco. She reports current alcohol use. She reports that she does not use drugs.    Family History  Family History   Problem  Relation Name Age of Onset   • Other (Glioblastoma) Father     • Cerebral palsy Sister     • Other (Malignant Neoplasm) Brother     • Other (Rheumatic Heart Disease) Brother         Medications  Current Outpatient Medications   Medication Instructions   • amLODIPine (NORVASC) 5 mg, oral, Daily   • azelastine (Astelin) 137 mcg (0.1 %) nasal spray 1 spray, Each Nostril, 2 times daily, Use in each nostril as directed   • levothyroxine (SYNTHROID, LEVOXYL) 25 mcg, oral, Daily, take on an empty stomach   • multivitamin tablet 1 tablet, oral, Daily   • potassium chloride CR 10 mEq ER tablet 10 mEq, oral, 2 times daily, Do not crush, chew, or split.       Allergies  Patient has no known allergies.    Review of Systems   Constitutional:  Negative for unexpected weight change.   HENT:  Positive for hearing loss.    Eyes:  Negative for visual disturbance.   Respiratory:  Negative for shortness of breath.    Gastrointestinal:  Negative for diarrhea, nausea and vomiting.   Endocrine:        As above         Last Recorded Vitals  Blood pressure 128/71, pulse 60, weight 55.3 kg (122 lb).    Physical Exam     Relevant Results  Lab Results   Component Value Date    TSH 0.11 (L) 11/02/2023    FREET4 0.90 11/02/2023      Latest Reference Range & Units 11/03/23 06:03   GLUCOSE 74 - 99 mg/dL 90   SODIUM 136 - 145 mmol/L 133 (L)   POTASSIUM 3.5 - 5.3 mmol/L 3.9   CHLORIDE 98 - 107 mmol/L 100   Bicarbonate 21 - 32 mmol/L 24   Anion Gap 10 - 20 mmol/L 13   Blood Urea Nitrogen 6 - 23 mg/dL 5 (L)   Creatinine 0.50 - 1.05 mg/dL 0.81   EGFR >60 mL/min/1.73m*2 73   Calcium 8.6 - 10.3 mg/dL 9.0   PHOSPHORUS 2.5 - 4.9 mg/dL 2.8   Albumin 3.4 - 5.0 g/dL 3.7       IMPRESSION  HYPOPITUITARISM, PARTIAL  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   She does not mount a TSH response due to  pituitary insufficiency.  Low TSH does not indicate thyrotoxicosis in this case.      RECOMMENDATIONS  Draw Free T4  Results available on [employer]  Call/message if you have not received results in 3 days.     Take levothyroxine on an empty stomach with water alone, 1 hour before eating or taking other medications, 4 hours before any calcium or iron supplement.     Continue hydrocortisone 5 mg by mouth twice daily     IF YOU ARE ILL, take double your hydrocortisone, 10 mg twice daily     Follow up 6 months  Repeat BMP, free T4 before next appointment.

## 2024-02-15 DIAGNOSIS — E23.0 HYPOPITUITARISM (MULTI): ICD-10-CM

## 2024-02-15 RX ORDER — HYDROCORTISONE 5 MG/1
5 TABLET ORAL EVERY 12 HOURS
Qty: 180 TABLET | Refills: 1 | Status: SHIPPED | OUTPATIENT
Start: 2024-02-15

## 2024-04-05 ENCOUNTER — TELEPHONE (OUTPATIENT)
Dept: ENDOCRINOLOGY | Facility: CLINIC | Age: 84
End: 2024-04-05
Payer: MEDICARE

## 2024-04-05 NOTE — TELEPHONE ENCOUNTER
Pt was called and left a VM regarding their T4 Levels being normal, and to continue their levothyroxine as prescribed.

## 2024-04-05 NOTE — TELEPHONE ENCOUNTER
----- Message from Maciel Chi MD sent at 2/18/2024  7:01 PM EST -----  Please advise T4 normal.  Continue current levothyroxine dose (and confirm that dose)

## 2024-04-10 ENCOUNTER — APPOINTMENT (OUTPATIENT)
Dept: PRIMARY CARE | Facility: CLINIC | Age: 84
End: 2024-04-10
Payer: MEDICARE

## 2024-04-25 ENCOUNTER — OFFICE VISIT (OUTPATIENT)
Dept: PRIMARY CARE | Facility: CLINIC | Age: 84
End: 2024-04-25
Payer: MEDICARE

## 2024-04-25 ENCOUNTER — LAB (OUTPATIENT)
Dept: LAB | Facility: LAB | Age: 84
End: 2024-04-25
Payer: MEDICARE

## 2024-04-25 VITALS
OXYGEN SATURATION: 98 % | DIASTOLIC BLOOD PRESSURE: 71 MMHG | HEIGHT: 59 IN | WEIGHT: 122 LBS | SYSTOLIC BLOOD PRESSURE: 131 MMHG | BODY MASS INDEX: 24.6 KG/M2 | HEART RATE: 65 BPM

## 2024-04-25 DIAGNOSIS — M81.8 OTHER OSTEOPOROSIS WITHOUT CURRENT PATHOLOGICAL FRACTURE: ICD-10-CM

## 2024-04-25 DIAGNOSIS — E23.0 HYPOPITUITARISM (MULTI): ICD-10-CM

## 2024-04-25 DIAGNOSIS — E03.9 ACQUIRED HYPOTHYROIDISM: ICD-10-CM

## 2024-04-25 DIAGNOSIS — Z00.00 ROUTINE GENERAL MEDICAL EXAMINATION AT HEALTH CARE FACILITY: ICD-10-CM

## 2024-04-25 DIAGNOSIS — E55.9 VITAMIN D INSUFFICIENCY: ICD-10-CM

## 2024-04-25 DIAGNOSIS — M81.0 AGE-RELATED OSTEOPOROSIS WITHOUT CURRENT PATHOLOGICAL FRACTURE: ICD-10-CM

## 2024-04-25 DIAGNOSIS — E23.0 ACTH DEFICIENCY (MULTI): ICD-10-CM

## 2024-04-25 DIAGNOSIS — E03.9 HYPOTHYROIDISM, UNSPECIFIED TYPE: ICD-10-CM

## 2024-04-25 DIAGNOSIS — E23.0 HYPOPITUITARISM (MULTI): Primary | ICD-10-CM

## 2024-04-25 DIAGNOSIS — E23.0: ICD-10-CM

## 2024-04-25 DIAGNOSIS — E87.6 HYPOKALEMIA: ICD-10-CM

## 2024-04-25 DIAGNOSIS — E03.8 OTHER SPECIFIED HYPOTHYROIDISM: ICD-10-CM

## 2024-04-25 LAB
25(OH)D3 SERPL-MCNC: 39 NG/ML (ref 30–100)
ANION GAP SERPL CALC-SCNC: 16 MMOL/L (ref 10–20)
BASOPHILS # BLD AUTO: 0.05 X10*3/UL (ref 0–0.1)
BASOPHILS NFR BLD AUTO: 0.8 %
BUN SERPL-MCNC: 16 MG/DL (ref 6–23)
CALCIUM SERPL-MCNC: 9.8 MG/DL (ref 8.6–10.6)
CHLORIDE SERPL-SCNC: 99 MMOL/L (ref 98–107)
CO2 SERPL-SCNC: 30 MMOL/L (ref 21–32)
CREAT SERPL-MCNC: 1.27 MG/DL (ref 0.5–1.05)
EGFRCR SERPLBLD CKD-EPI 2021: 42 ML/MIN/1.73M*2
EOSINOPHIL # BLD AUTO: 0.11 X10*3/UL (ref 0–0.4)
EOSINOPHIL NFR BLD AUTO: 1.8 %
ERYTHROCYTE [DISTWIDTH] IN BLOOD BY AUTOMATED COUNT: 14.1 % (ref 11.5–14.5)
GLUCOSE SERPL-MCNC: 79 MG/DL (ref 74–99)
HCT VFR BLD AUTO: 47.5 % (ref 36–46)
HGB BLD-MCNC: 15.3 G/DL (ref 12–16)
IMM GRANULOCYTES # BLD AUTO: 0.02 X10*3/UL (ref 0–0.5)
IMM GRANULOCYTES NFR BLD AUTO: 0.3 % (ref 0–0.9)
LYMPHOCYTES # BLD AUTO: 1.81 X10*3/UL (ref 0.8–3)
LYMPHOCYTES NFR BLD AUTO: 30.1 %
MAGNESIUM SERPL-MCNC: 2.04 MG/DL (ref 1.6–2.4)
MCH RBC QN AUTO: 28.5 PG (ref 26–34)
MCHC RBC AUTO-ENTMCNC: 32.2 G/DL (ref 32–36)
MCV RBC AUTO: 89 FL (ref 80–100)
MONOCYTES # BLD AUTO: 0.46 X10*3/UL (ref 0.05–0.8)
MONOCYTES NFR BLD AUTO: 7.7 %
NEUTROPHILS # BLD AUTO: 3.56 X10*3/UL (ref 1.6–5.5)
NEUTROPHILS NFR BLD AUTO: 59.3 %
NRBC BLD-RTO: 0 /100 WBCS (ref 0–0)
PLATELET # BLD AUTO: 376 X10*3/UL (ref 150–450)
POTASSIUM SERPL-SCNC: 4 MMOL/L (ref 3.5–5.3)
RBC # BLD AUTO: 5.37 X10*6/UL (ref 4–5.2)
SODIUM SERPL-SCNC: 141 MMOL/L (ref 136–145)
TSH SERPL-ACNC: 0.64 MIU/L (ref 0.44–3.98)
WBC # BLD AUTO: 6 X10*3/UL (ref 4.4–11.3)

## 2024-04-25 PROCEDURE — 3075F SYST BP GE 130 - 139MM HG: CPT | Performed by: INTERNAL MEDICINE

## 2024-04-25 PROCEDURE — 1170F FXNL STATUS ASSESSED: CPT | Performed by: INTERNAL MEDICINE

## 2024-04-25 PROCEDURE — G0439 PPPS, SUBSEQ VISIT: HCPCS | Performed by: INTERNAL MEDICINE

## 2024-04-25 PROCEDURE — 36415 COLL VENOUS BLD VENIPUNCTURE: CPT

## 2024-04-25 PROCEDURE — 81001 URINALYSIS AUTO W/SCOPE: CPT

## 2024-04-25 PROCEDURE — 83735 ASSAY OF MAGNESIUM: CPT

## 2024-04-25 PROCEDURE — 1126F AMNT PAIN NOTED NONE PRSNT: CPT | Performed by: INTERNAL MEDICINE

## 2024-04-25 PROCEDURE — 84443 ASSAY THYROID STIM HORMONE: CPT

## 2024-04-25 PROCEDURE — 99214 OFFICE O/P EST MOD 30 MIN: CPT | Performed by: INTERNAL MEDICINE

## 2024-04-25 PROCEDURE — 82306 VITAMIN D 25 HYDROXY: CPT

## 2024-04-25 PROCEDURE — 1159F MED LIST DOCD IN RCRD: CPT | Performed by: INTERNAL MEDICINE

## 2024-04-25 PROCEDURE — 1160F RVW MEDS BY RX/DR IN RCRD: CPT | Performed by: INTERNAL MEDICINE

## 2024-04-25 PROCEDURE — 85025 COMPLETE CBC W/AUTO DIFF WBC: CPT

## 2024-04-25 PROCEDURE — 80048 BASIC METABOLIC PNL TOTAL CA: CPT

## 2024-04-25 PROCEDURE — 3078F DIAST BP <80 MM HG: CPT | Performed by: INTERNAL MEDICINE

## 2024-04-25 RX ORDER — ALENDRONATE SODIUM 70 MG/1
70 TABLET ORAL
Qty: 4 TABLET | Refills: 3 | Status: SHIPPED | OUTPATIENT
Start: 2024-04-25 | End: 2025-04-25

## 2024-04-25 ASSESSMENT — MINI MENTAL STATE EXAM
SUM ALL MMSE QUESTIONS FOR TOTAL SCORE [OUT OF 30].: 30
HAND THE PERSON A PENCIL AND PAPER. SAY:  WRITE ANY COMPLETE SENTENCE ON THAT PIECE OF PAPER. (NOTE: THE SENTENCE MUST MAKE SENSE.  IGNORE SPELLING ERRORS): 1 CORRECT
SAY:  READ THE WORDS ON THE PAGE AND THEN DO WHAT IT SAYS.  THEN HAND THE PERSON THE SHEET WITH CLOSE YOUR EYES ON IT.  IF THE SUBJECT READS AND DOES NOT CLOSE THEIR EYES, REPEAT UP TO THREE TIMES.  SCORE ONLY IF SUBJECT CLOSES EYES.: 3 CORRECT
SAY: I WOULD LIKE YOU TO REPEAT THIS PHRASE AFTER ME: NO IFS, ANDS, OR BUTS.: 1 CORRECT
RECALL THE 3 OBJECTS FROM ABOVE (APPLE, TABLE, PENNY) OR (BALL, TREE, FLAG): 3 CORRECT
PLEASE COPY THIS PICTURE (NOTE ALL 10 ANGLES MUST BE PRESENT AND TWO MUST INTERSECT): 1 CORRECT
SPELL THE WORD WORLD FORWARD AND BACKWARDS OR SERIAL 7S: 5 CORRECT
NAME OR REPEAT 3 OBJECTS - (APPLE, TABLE, PENNY) OR (BALL, TREE, FLAG): 3 CORRECT
PLACE DESIGN, ERASER AND PENCIL IN FRONT OF THE PERSON.  SAY:  COPY THIS DESIGN PLEASE.  SHOW: DESIGN. ALLOW: MULTIPLE TRIES. WAIT UNTIL PERSON IS FINISHED AND HANDS IT BACK. SCORE: ONLY FOR DIAGRAM WITH 4-SIDED FIGURE BETWEEN TWO 5-SIDED FIGURES: 1 CORRECT
SHOW: PENCIL [OBJECT] ASK: WHAT IS THIS CALLED?: 2 CORRECT
WHAT IS THE YEAR, SEASON, DATE, DAY, AND MONTH: 5 CORRECT
WHAT STATE, COUNTRY, CITY, HOSPITAL, FLOOR: 5 CORRECT

## 2024-04-25 ASSESSMENT — PAIN SCALES - GENERAL: PAINLEVEL: 0-NO PAIN

## 2024-04-25 ASSESSMENT — PATIENT HEALTH QUESTIONNAIRE - PHQ9
1. LITTLE INTEREST OR PLEASURE IN DOING THINGS: NOT AT ALL
SUM OF ALL RESPONSES TO PHQ9 QUESTIONS 1 AND 2: 0
2. FEELING DOWN, DEPRESSED OR HOPELESS: NOT AT ALL
1. LITTLE INTEREST OR PLEASURE IN DOING THINGS: NOT AT ALL
1. LITTLE INTEREST OR PLEASURE IN DOING THINGS: NOT AT ALL
SUM OF ALL RESPONSES TO PHQ9 QUESTIONS 1 AND 2: 0
2. FEELING DOWN, DEPRESSED OR HOPELESS: NOT AT ALL
SUM OF ALL RESPONSES TO PHQ9 QUESTIONS 1 AND 2: 0
2. FEELING DOWN, DEPRESSED OR HOPELESS: NOT AT ALL

## 2024-04-25 ASSESSMENT — ACTIVITIES OF DAILY LIVING (ADL)
GROCERY_SHOPPING: INDEPENDENT
BATHING: DEPENDENT
DOING_HOUSEWORK: INDEPENDENT
TAKING_MEDICATION: INDEPENDENT
MANAGING_FINANCES: INDEPENDENT
DRESSING: DEPENDENT

## 2024-04-25 NOTE — PROGRESS NOTES
Subjective   Patient ID: Bridgette Moody is a 83 y.o. female who presents for Medicare Annual Wellness Visit Subsequent.    HPI     Review of Systems    Objective   There were no vitals taken for this visit.    Physical Exam    Assessment/Plan

## 2024-04-25 NOTE — PROGRESS NOTES
"Subjective   Reason for Visit: Bridgette Moody is an 83 y.o. female here for a Medicare Wellness visit.          Reviewed all medications by prescribing practitioner or clinical pharmacist (such as prescriptions, OTCs, herbal therapies and supplements) and documented in the medical record.    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   She is doing fairly well and is here for Medicare annual wellness exam.  She denies any recent falls, headache, chest pain, fever or chills and palpitation.  She forgot to get recent blood work done.  She scored 30 out of 30 on her Mini-Mental state exam.  Patient Care Team:  Vimal Mac MD as PCP - General  Vimal Mac MD as PCP - [employer] PCP     Review of Systems   Constitutional: Negative.    HENT: Negative.     Eyes: Negative.    Respiratory: Negative.     Cardiovascular: Negative.    Gastrointestinal: Negative.    Endocrine: Negative.    Genitourinary: Negative.    Musculoskeletal: Negative.    Skin: Negative.    Allergic/Immunologic: Negative.    Neurological: Negative.    Hematological: Negative.    Psychiatric/Behavioral: Negative.         Objective   Vitals:  /71   Pulse 65   Ht 1.499 m (4' 11\")   Wt 55.3 kg (122 lb)   SpO2 98%   BMI 24.64 kg/m²       Physical Exam  Constitutional:       Appearance: Normal appearance. She is normal weight.   HENT:      Right Ear: Tympanic membrane normal.      Left Ear: Tympanic membrane and ear canal normal.      Nose: Nose normal.   Neck:      Vascular: No carotid bruit.   Cardiovascular:      Rate and Rhythm: Normal rate.   Pulmonary:      Effort: No respiratory distress.      Breath sounds: No stridor. No wheezing.   Abdominal:      " Palpations: Abdomen is soft.      Tenderness: There is no abdominal tenderness. There is no guarding or rebound.   Skin:     Coloration: Skin is not jaundiced.      Findings: No bruising.   Neurological:      General: No focal deficit present.      Mental Status: She is alert and oriented to person, place, and time.   Psychiatric:         Mood and Affect: Mood normal.         Assessment/Plan   Problem List Items Addressed This Visit    None  Visit Diagnoses       Routine general medical examination at health care facility            Patient is refusing vaccinations due to its side effect.  She will be started on Fosamax 70 mg weekly for osteoporosis.  She will continue to follow-up with endocrinology clinic regarding history of hypopituitarism.  She will continue current medications and will return to clinic in 6 months for follow-up visit.

## 2024-04-26 LAB
APPEARANCE UR: CLEAR
BILIRUB UR STRIP.AUTO-MCNC: NEGATIVE MG/DL
COLOR UR: YELLOW
GLUCOSE UR STRIP.AUTO-MCNC: NORMAL MG/DL
HYALINE CASTS #/AREA URNS AUTO: ABNORMAL /LPF
KETONES UR STRIP.AUTO-MCNC: NEGATIVE MG/DL
LEUKOCYTE ESTERASE UR QL STRIP.AUTO: ABNORMAL
MUCOUS THREADS #/AREA URNS AUTO: ABNORMAL /LPF
NITRITE UR QL STRIP.AUTO: NEGATIVE
PH UR STRIP.AUTO: 5.5 [PH]
PROT UR STRIP.AUTO-MCNC: ABNORMAL MG/DL
RBC # UR STRIP.AUTO: NEGATIVE /UL
RBC #/AREA URNS AUTO: ABNORMAL /HPF
SP GR UR STRIP.AUTO: 1.02
SQUAMOUS #/AREA URNS AUTO: ABNORMAL /HPF
UROBILINOGEN UR STRIP.AUTO-MCNC: NORMAL MG/DL
WBC #/AREA URNS AUTO: ABNORMAL /HPF

## 2024-04-26 ASSESSMENT — ENCOUNTER SYMPTOMS
ENDOCRINE NEGATIVE: 1
RESPIRATORY NEGATIVE: 1
NEUROLOGICAL NEGATIVE: 1
EYES NEGATIVE: 1
CONSTITUTIONAL NEGATIVE: 1
HEMATOLOGIC/LYMPHATIC NEGATIVE: 1
PSYCHIATRIC NEGATIVE: 1
MUSCULOSKELETAL NEGATIVE: 1
CARDIOVASCULAR NEGATIVE: 1
ALLERGIC/IMMUNOLOGIC NEGATIVE: 1
GASTROINTESTINAL NEGATIVE: 1

## 2024-05-15 DIAGNOSIS — E03.9 HYPOTHYROIDISM, UNSPECIFIED TYPE: ICD-10-CM

## 2024-05-15 RX ORDER — LEVOTHYROXINE SODIUM 25 UG/1
25 TABLET ORAL DAILY
Qty: 90 TABLET | Refills: 1 | Status: SHIPPED | OUTPATIENT
Start: 2024-05-15

## 2024-07-05 PROBLEM — E86.0 DEHYDRATION: Status: RESOLVED | Noted: 2024-07-05 | Resolved: 2024-07-05

## 2024-07-05 PROBLEM — R11.2 NAUSEA AND VOMITING: Status: RESOLVED | Noted: 2024-07-05 | Resolved: 2024-07-05

## 2024-07-05 PROBLEM — R19.7 DIARRHEA: Status: RESOLVED | Noted: 2024-07-05 | Resolved: 2024-07-05

## 2024-07-05 PROBLEM — J06.9 ACUTE UPPER RESPIRATORY INFECTION: Status: RESOLVED | Noted: 2024-07-05 | Resolved: 2024-07-05

## 2024-07-05 PROBLEM — N28.9 ACUTE RENAL IMPAIRMENT: Status: RESOLVED | Noted: 2024-07-05 | Resolved: 2024-07-05

## 2024-07-05 PROBLEM — E83.42 HYPOMAGNESEMIA: Status: ACTIVE | Noted: 2024-07-05

## 2024-07-05 PROBLEM — I10 HYPERTENSIVE DISORDER: Status: ACTIVE | Noted: 2024-07-05

## 2024-07-05 PROBLEM — E66.3 OVERWEIGHT WITH BODY MASS INDEX (BMI) 25.0-29.9: Status: RESOLVED | Noted: 2024-07-05 | Resolved: 2024-07-05

## 2024-07-05 PROBLEM — E78.5 DYSLIPIDEMIA: Status: RESOLVED | Noted: 2023-08-25 | Resolved: 2024-07-05

## 2024-07-05 PROBLEM — I10 BENIGN ESSENTIAL HYPERTENSION: Status: ACTIVE | Noted: 2023-01-12

## 2024-07-05 PROBLEM — J69.0 ASPIRATION PNEUMONIA DUE TO VOMIT (MULTI): Status: RESOLVED | Noted: 2024-07-05 | Resolved: 2024-07-05

## 2024-07-05 PROBLEM — J18.9 PNEUMONIA DUE TO INFECTIOUS ORGANISM: Status: ACTIVE | Noted: 2024-07-05

## 2024-07-05 PROBLEM — R50.9 FEVER: Status: RESOLVED | Noted: 2024-07-05 | Resolved: 2024-07-05

## 2024-07-05 PROBLEM — G92.8 TOXIC METABOLIC ENCEPHALOPATHY: Status: RESOLVED | Noted: 2024-07-05 | Resolved: 2024-07-05

## 2024-07-05 PROBLEM — M62.82 NON-TRAUMATIC RHABDOMYOLYSIS: Status: RESOLVED | Noted: 2024-07-05 | Resolved: 2024-07-05

## 2024-07-05 PROBLEM — K57.30 DIVERTICULOSIS OF LARGE INTESTINE WITHOUT PERFORATION OR ABSCESS WITHOUT BLEEDING: Status: RESOLVED | Noted: 2024-07-05 | Resolved: 2024-07-05

## 2024-07-09 ENCOUNTER — APPOINTMENT (OUTPATIENT)
Dept: PRIMARY CARE | Facility: CLINIC | Age: 84
End: 2024-07-09
Payer: MEDICARE

## 2024-07-10 ENCOUNTER — APPOINTMENT (OUTPATIENT)
Dept: PRIMARY CARE | Facility: CLINIC | Age: 84
End: 2024-07-10
Payer: MEDICARE

## 2024-07-10 ENCOUNTER — TELEPHONE (OUTPATIENT)
Dept: PRIMARY CARE | Facility: CLINIC | Age: 84
End: 2024-07-10

## 2024-07-10 VITALS
SYSTOLIC BLOOD PRESSURE: 109 MMHG | HEIGHT: 61 IN | WEIGHT: 124.8 LBS | TEMPERATURE: 97.5 F | HEART RATE: 59 BPM | OXYGEN SATURATION: 95 % | BODY MASS INDEX: 23.56 KG/M2 | DIASTOLIC BLOOD PRESSURE: 67 MMHG

## 2024-07-10 DIAGNOSIS — E23.0: ICD-10-CM

## 2024-07-10 DIAGNOSIS — Z86.39 HISTORY OF VITAMIN D DEFICIENCY: ICD-10-CM

## 2024-07-10 DIAGNOSIS — I10 BENIGN ESSENTIAL HYPERTENSION: ICD-10-CM

## 2024-07-10 DIAGNOSIS — E03.9 ACQUIRED HYPOTHYROIDISM: ICD-10-CM

## 2024-07-10 DIAGNOSIS — E23.0 HYPOPITUITARISM (MULTI): ICD-10-CM

## 2024-07-10 DIAGNOSIS — E78.00 PURE HYPERCHOLESTEROLEMIA: ICD-10-CM

## 2024-07-10 DIAGNOSIS — Z00.00 ROUTINE GENERAL MEDICAL EXAMINATION AT A HEALTH CARE FACILITY: ICD-10-CM

## 2024-07-10 DIAGNOSIS — E03.9 ACQUIRED HYPOTHYROIDISM: Primary | ICD-10-CM

## 2024-07-10 DIAGNOSIS — I10 BENIGN ESSENTIAL HYPERTENSION: Primary | ICD-10-CM

## 2024-07-10 PROBLEM — J18.9 PNEUMONIA DUE TO INFECTIOUS ORGANISM: Status: RESOLVED | Noted: 2024-07-05 | Resolved: 2024-07-10

## 2024-07-10 PROBLEM — E87.6 HYPOKALEMIA: Status: RESOLVED | Noted: 2023-02-21 | Resolved: 2024-07-10

## 2024-07-10 PROBLEM — E87.1 HYPONATREMIA: Status: RESOLVED | Noted: 2023-11-02 | Resolved: 2024-07-10

## 2024-07-10 PROBLEM — R73.9 HYPERGLYCEMIA: Status: RESOLVED | Noted: 2023-02-21 | Resolved: 2024-07-10

## 2024-07-10 PROBLEM — E83.42 HYPOMAGNESEMIA: Status: RESOLVED | Noted: 2024-07-05 | Resolved: 2024-07-10

## 2024-07-10 PROBLEM — E55.9 VITAMIN D DEFICIENCY: Status: RESOLVED | Noted: 2023-02-21 | Resolved: 2024-07-10

## 2024-07-10 PROCEDURE — 1160F RVW MEDS BY RX/DR IN RCRD: CPT | Performed by: FAMILY MEDICINE

## 2024-07-10 PROCEDURE — 1159F MED LIST DOCD IN RCRD: CPT | Performed by: FAMILY MEDICINE

## 2024-07-10 PROCEDURE — 3074F SYST BP LT 130 MM HG: CPT | Performed by: FAMILY MEDICINE

## 2024-07-10 PROCEDURE — 99204 OFFICE O/P NEW MOD 45 MIN: CPT | Performed by: FAMILY MEDICINE

## 2024-07-10 PROCEDURE — 3078F DIAST BP <80 MM HG: CPT | Performed by: FAMILY MEDICINE

## 2024-07-10 PROCEDURE — 1036F TOBACCO NON-USER: CPT | Performed by: FAMILY MEDICINE

## 2024-07-10 ASSESSMENT — COLUMBIA-SUICIDE SEVERITY RATING SCALE - C-SSRS
2. HAVE YOU ACTUALLY HAD ANY THOUGHTS OF KILLING YOURSELF?: NO
1. IN THE PAST MONTH, HAVE YOU WISHED YOU WERE DEAD OR WISHED YOU COULD GO TO SLEEP AND NOT WAKE UP?: NO
6. HAVE YOU EVER DONE ANYTHING, STARTED TO DO ANYTHING, OR PREPARED TO DO ANYTHING TO END YOUR LIFE?: NO

## 2024-07-10 ASSESSMENT — ENCOUNTER SYMPTOMS
RESPIRATORY NEGATIVE: 1
CARDIOVASCULAR NEGATIVE: 1

## 2024-07-10 ASSESSMENT — PATIENT HEALTH QUESTIONNAIRE - PHQ9
1. LITTLE INTEREST OR PLEASURE IN DOING THINGS: NOT AT ALL
2. FEELING DOWN, DEPRESSED OR HOPELESS: NOT AT ALL
SUM OF ALL RESPONSES TO PHQ9 QUESTIONS 1 AND 2: 0

## 2024-07-10 NOTE — PROGRESS NOTES
"Subjective   Patient ID: Bridgette Moody is a 83 y.o. female who presents for New Patient Visit (Establish care ).    The patient is new to the practice and is transferring her care from Dr. Mac.  She is here to establish care.  She had a CPE on 4/25/2024.  1) HTN: controlled, compliant with amlodipine, does not check her blood pressure at home, follows a low salt diet, no exercise routine but is very active in the home and walks.    2) Hypothyroidism: controlled, compliant with levothyroxine, under the care of dulce (Dr. Dolan), asymptomatic, seen every 6 months, next appointment is on 8/13.  3) History of Delma's syndrome/Hypopituitarism/Deficiency ACTH: controlled, compliant with hydrocortisone, under the care of dulce (Dr. Dolan), seen every 6 months, next appointment is on 8/13.  The patient states she stopped taking Fosamax due to side effects.    Review of Systems   Respiratory: Negative.     Cardiovascular: Negative.      Objective   /67 (BP Location: Left arm, Patient Position: Sitting, BP Cuff Size: Adult)   Pulse 59   Temp 36.4 °C (97.5 °F) (Temporal)   Ht 1.549 m (5' 1\")   Wt 56.6 kg (124 lb 12.8 oz)   SpO2 95%   BMI 23.58 kg/m²     Physical Exam  Vitals and nursing note reviewed.   Constitutional:       Appearance: Normal appearance.   Cardiovascular:      Rate and Rhythm: Normal rate and regular rhythm.   Pulmonary:      Effort: Pulmonary effort is normal.      Breath sounds: Normal breath sounds.   Neurological:      Mental Status: She is alert.     Assessment/Plan   Diagnoses and all orders for this visit:  Benign essential hypertension  Controlled.  Continue with amlodipine.  Low salt diet.  Regular exercise.  Maintain normal weight.  Follow up in April for CPE.    Acquired hypothyroidism  Controlled.  Continue with levothyroxine.  Patient will discuss refills with endo.  Keep follow-up appointments with endo.  Await input.  Follow up in April for CPE.    Delma's syndrome " (Multi)/Hypopituitarism (Multi)Deficiency of adrenocorticotropic hormone (ACTH) (Multi)  Controlled.  Continue with hydrocortisone.  Patient will discuss refills with endo.  Keep follow-up appointments with endo.  Await input.  Follow up in April for CPE.    Other orders  -     Follow Up In Primary Care - Health Maintenance; Future

## 2024-07-25 ENCOUNTER — APPOINTMENT (OUTPATIENT)
Dept: PRIMARY CARE | Facility: CLINIC | Age: 84
End: 2024-07-25
Payer: MEDICARE

## 2024-08-05 ENCOUNTER — LAB (OUTPATIENT)
Dept: LAB | Facility: LAB | Age: 84
End: 2024-08-05
Payer: MEDICARE

## 2024-08-05 DIAGNOSIS — E23.0 HYPOPITUITARISM (MULTI): ICD-10-CM

## 2024-08-05 LAB
ANION GAP SERPL CALC-SCNC: 11 MMOL/L (ref 10–20)
BUN SERPL-MCNC: 17 MG/DL (ref 6–23)
CALCIUM SERPL-MCNC: 10 MG/DL (ref 8.6–10.3)
CHLORIDE SERPL-SCNC: 97 MMOL/L (ref 98–107)
CO2 SERPL-SCNC: 33 MMOL/L (ref 21–32)
CREAT SERPL-MCNC: 1.29 MG/DL (ref 0.5–1.05)
EGFRCR SERPLBLD CKD-EPI 2021: 41 ML/MIN/1.73M*2
GLUCOSE SERPL-MCNC: 96 MG/DL (ref 74–99)
POTASSIUM SERPL-SCNC: 3.8 MMOL/L (ref 3.5–5.3)
SODIUM SERPL-SCNC: 137 MMOL/L (ref 136–145)
TSH SERPL-ACNC: 0.68 MIU/L (ref 0.44–3.98)

## 2024-08-05 PROCEDURE — 36415 COLL VENOUS BLD VENIPUNCTURE: CPT

## 2024-08-13 ENCOUNTER — APPOINTMENT (OUTPATIENT)
Dept: ENDOCRINOLOGY | Facility: CLINIC | Age: 84
End: 2024-08-13
Payer: MEDICARE

## 2024-08-13 VITALS
DIASTOLIC BLOOD PRESSURE: 70 MMHG | BODY MASS INDEX: 23.24 KG/M2 | SYSTOLIC BLOOD PRESSURE: 111 MMHG | WEIGHT: 123 LBS | HEART RATE: 56 BPM

## 2024-08-13 DIAGNOSIS — E23.0 HYPOPITUITARISM (MULTI): ICD-10-CM

## 2024-08-13 DIAGNOSIS — E03.9 HYPOTHYROIDISM, UNSPECIFIED TYPE: ICD-10-CM

## 2024-08-13 PROCEDURE — 3074F SYST BP LT 130 MM HG: CPT | Performed by: INTERNAL MEDICINE

## 2024-08-13 PROCEDURE — 1159F MED LIST DOCD IN RCRD: CPT | Performed by: INTERNAL MEDICINE

## 2024-08-13 PROCEDURE — 99213 OFFICE O/P EST LOW 20 MIN: CPT | Performed by: INTERNAL MEDICINE

## 2024-08-13 PROCEDURE — 1036F TOBACCO NON-USER: CPT | Performed by: INTERNAL MEDICINE

## 2024-08-13 PROCEDURE — 1160F RVW MEDS BY RX/DR IN RCRD: CPT | Performed by: INTERNAL MEDICINE

## 2024-08-13 PROCEDURE — 3078F DIAST BP <80 MM HG: CPT | Performed by: INTERNAL MEDICINE

## 2024-08-13 RX ORDER — HYDROCORTISONE 5 MG/1
5 TABLET ORAL EVERY 12 HOURS
Qty: 180 TABLET | Refills: 3 | Status: SHIPPED | OUTPATIENT
Start: 2024-08-13

## 2024-08-13 RX ORDER — LEVOTHYROXINE SODIUM 25 UG/1
25 TABLET ORAL DAILY
Qty: 90 TABLET | Refills: 3 | Status: SHIPPED | OUTPATIENT
Start: 2024-08-13

## 2024-08-13 ASSESSMENT — ENCOUNTER SYMPTOMS
TREMORS: 0
VOMITING: 0
PALPITATIONS: 0
DIARRHEA: 0
ABDOMINAL PAIN: 0
UNEXPECTED WEIGHT CHANGE: 0
FATIGUE: 1
HEADACHES: 0
SHORTNESS OF BREATH: 0
WEAKNESS: 0
NECK PAIN: 0
CONSTIPATION: 0
NAUSEA: 0
NERVOUS/ANXIOUS: 0
FEVER: 0
TROUBLE SWALLOWING: 0

## 2024-08-13 NOTE — PROGRESS NOTES
History Of Present Illness  Bridgette Moody is a 83 y.o. 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       Levothyroxine 25 mcg/day.    Patient is taking levothyroxine on an empty stomach with amlodipine.    Past Medical History  She has a past medical history of Acute renal impairment (07/05/2024), Acute upper respiratory infection (07/05/2024), Aspiration pneumonia due to vomit (Multi) (07/05/2024), Body mass index (BMI) 25.0-25.9, adult (02/10/2021), Body mass index (BMI) 25.0-25.9, adult (11/04/2021), Dehydration (07/05/2024), Diarrhea (07/05/2024), Disorder of thyroid, unspecified (01/21/2021), Diverticulosis of large intestine without perforation or abscess without bleeding (07/05/2024), Dyslipidemia (08/25/2023), Essential (primary) hypertension (07/18/2022), Fever (07/05/2024), Hypokalemia (03/08/2022), Hypopituitarism (Multi) (04/28/2022), Hypothyroidism, unspecified (07/18/2022), Nausea and vomiting (07/05/2024), Non-traumatic rhabdomyolysis (07/05/2024), Other disorders of pituitary gland (Multi) (11/04/2021), Overweight with body mass index (BMI) 25.0-29.9 (07/05/2024), Syncope (11/17/2014), Unspecified hearing loss, unspecified ear (01/24/2021), and Vomiting and diarrhea (11/17/2014).    Surgical History  She has a past surgical history that includes Other surgical history (01/24/2021) and Other surgical history (01/24/2021).     Social History  She reports that she has never smoked. She has never used smokeless tobacco. She reports that she does not currently use alcohol. She reports that she does not use drugs.    Family History  Family History   Problem Relation Name Age of Onset    Other (Glioblastoma) Father      Hypertension Father      Cerebral palsy Sister      Other (Malignant Neoplasm) Brother      Other (Rheumatic Heart Disease) Brother      Diabetes Daughter         Medications  Current Outpatient Medications   Medication Instructions     amLODIPine (NORVASC) 5 mg, oral, Daily    hydrocortisone (CORTEF) 5 mg, oral, Every 12 hours    levothyroxine (SYNTHROID, LEVOXYL) 25 mcg, oral, Daily, take on an empty stomach    multivitamin tablet 1 tablet, oral, Daily    potassium chloride CR 10 mEq ER tablet 10 mEq, oral, 2 times daily, Do not crush, chew, or split.       Allergies  Patient has no known allergies.    Review of Systems   Constitutional:  Positive for fatigue. Negative for fever and unexpected weight change.   HENT:  Positive for hearing loss. Negative for trouble swallowing.    Eyes:  Negative for visual disturbance.   Respiratory:  Negative for shortness of breath.    Cardiovascular:  Negative for chest pain and palpitations.   Gastrointestinal:  Negative for abdominal pain, constipation, diarrhea, nausea and vomiting.   Endocrine: Negative for cold intolerance and heat intolerance.   Musculoskeletal:  Negative for neck pain.   Skin:  Negative for rash.   Neurological:  Negative for tremors, weakness and headaches.   Psychiatric/Behavioral:  The patient is not nervous/anxious.          Last Recorded Vitals  Blood pressure 111/70, pulse 56, weight 55.8 kg (123 lb).    Physical Exam  Constitutional:       General: She is not in acute distress.     Comments: Hard of hearing   Neurological:      Mental Status: She is alert.   Psychiatric:         Mood and Affect: Affect normal.          Relevant Results  Lab Results   Component Value Date    TSH 0.68 08/05/2024    FREET4 0.62 02/07/2024      Latest Reference Range & Units 08/05/24 10:30   GLUCOSE 74 - 99 mg/dL 96   SODIUM 136 - 145 mmol/L 137   POTASSIUM 3.5 - 5.3 mmol/L 3.8   CHLORIDE 98 - 107 mmol/L 97 (L)   Bicarbonate 21 - 32 mmol/L 33 (H)   Anion Gap 10 - 20 mmol/L 11   Blood Urea Nitrogen 6 - 23 mg/dL 17   Creatinine 0.50 - 1.05 mg/dL 1.29 (H)   EGFR >60 mL/min/1.73m*2 41 (L)   Calcium 8.6 - 10.3 mg/dL 10.0       IMPRESSION  HYPOPITUITARISM, PARTIAL  Partial hypopituitarism due to  Delma's syndrome, with late onset secondary hypothyroidism and adrenal insufficiency  Stable hydrocortisone       RECOMMENDATIONS  Continue levothyroxine 25 mcg/day  Take levothyroxine on an empty stomach with water alone, 1 hour before eating or taking other medications, 4 hours before any calcium or iron supplement.     Continue hydrocortisone 5 mg by mouth twice daily     IF YOU ARE ILL, take double your hydrocortisone, 10 mg twice daily     Follow up 1 year  Repeat BMP, free T4 before next appointment.

## 2024-08-13 NOTE — PATIENT INSTRUCTIONS
RECOMMENDATIONS  Continue levothyroxine 25 mcg/day  Take levothyroxine on an empty stomach with water alone, 1 hour before eating or taking other medications, 4 hours before any calcium or iron supplement.     Continue hydrocortisone 5 mg by mouth twice daily     IF YOU ARE ILL, take double your hydrocortisone, 10 mg twice daily     Follow up 1 year  Repeat BMP, free T4 before next appointment.

## 2024-09-15 PROBLEM — E78.00 PURE HYPERCHOLESTEROLEMIA: Status: ACTIVE | Noted: 2024-09-15

## 2024-10-01 ENCOUNTER — TELEPHONE (OUTPATIENT)
Dept: PRIMARY CARE | Facility: CLINIC | Age: 84
End: 2024-10-01
Payer: MEDICARE

## 2024-10-01 DIAGNOSIS — I10 BENIGN ESSENTIAL HYPERTENSION: ICD-10-CM

## 2024-10-01 NOTE — TELEPHONE ENCOUNTER
Patient called Rx line and requested a refill for Amlodipine 5 mg.  Pharmacy: [facility]

## 2024-10-02 RX ORDER — AMLODIPINE BESYLATE 5 MG/1
5 TABLET ORAL DAILY
Qty: 90 TABLET | Refills: 2 | Status: SHIPPED | OUTPATIENT
Start: 2024-10-02 | End: 2025-10-02

## 2025-01-30 ENCOUNTER — TELEPHONE (OUTPATIENT)
Dept: ENDOCRINOLOGY | Facility: CLINIC | Age: 85
End: 2025-01-30
Payer: MEDICARE

## 2025-01-30 NOTE — TELEPHONE ENCOUNTER
Pt called to verify next visit- provided date/time/address and pt confirmed and stated she has it written down

## 2025-02-03 DIAGNOSIS — E23.0 ACTH DEFICIENCY: ICD-10-CM

## 2025-02-04 RX ORDER — POTASSIUM CHLORIDE 750 MG/1
TABLET, EXTENDED RELEASE ORAL
Qty: 180 TABLET | Refills: 0 | OUTPATIENT
Start: 2025-02-04

## 2025-04-29 ENCOUNTER — APPOINTMENT (OUTPATIENT)
Dept: PRIMARY CARE | Facility: CLINIC | Age: 85
End: 2025-04-29
Payer: MEDICARE

## 2025-06-18 DIAGNOSIS — I10 BENIGN ESSENTIAL HYPERTENSION: ICD-10-CM

## 2025-06-18 RX ORDER — AMLODIPINE BESYLATE 5 MG/1
5 TABLET ORAL DAILY
Qty: 90 TABLET | Refills: 0 | OUTPATIENT
Start: 2025-06-18

## 2025-08-13 ENCOUNTER — APPOINTMENT (OUTPATIENT)
Dept: ENDOCRINOLOGY | Facility: CLINIC | Age: 85
End: 2025-08-13
Payer: MEDICARE